# Patient Record
Sex: FEMALE | Race: WHITE | Employment: STUDENT | URBAN - METROPOLITAN AREA
[De-identification: names, ages, dates, MRNs, and addresses within clinical notes are randomized per-mention and may not be internally consistent; named-entity substitution may affect disease eponyms.]

---

## 2017-01-23 ENCOUNTER — TELEPHONE (OUTPATIENT)
Dept: PULMONOLOGY | Facility: CLINIC | Age: 27
End: 2017-01-23

## 2017-01-23 NOTE — TELEPHONE ENCOUNTER
Prior Authorization Retail Medication Request  Medication/Dose: Cayston   Diagnosis and ICD code: e84.0  New/Renewal/Insurance Change PA:   Previously Tried and Failed Therapies:     Insurance ID (if provided):   Insurance Phone (if provided):     Any additional info from fax request:     If you received a fax notification from an outside Pharmacy:  Pharmacy Name:  Pharmacy #:  Pharmacy Fax:

## 2017-01-27 DIAGNOSIS — E84.0 CYSTIC FIBROSIS WITH PULMONARY MANIFESTATIONS (H): Primary | ICD-10-CM

## 2017-01-27 RX ORDER — TOBRAMYCIN INHALATION SOLUTION 300 MG/5ML
300 INHALANT RESPIRATORY (INHALATION) 2 TIMES DAILY
Qty: 280 ML | Refills: 6 | Status: SHIPPED | OUTPATIENT
Start: 2017-01-27 | End: 2017-05-24

## 2017-01-31 NOTE — TELEPHONE ENCOUNTER
[facility] Prior Authorization Team   Phone: [phone]  Fax: [phone]      PA Initiation    Medication: Cayston   Insurance Company: [employer] - Phone [phone] Fax [phone]  Pharmacy Filling the Rx: [facility] - [address]  Filling Pharmacy Phone: [phone]  Filling Pharmacy Fax: [phone]  Start Date: 1/31/2017

## 2017-02-02 ENCOUNTER — TELEPHONE (OUTPATIENT)
Dept: PULMONOLOGY | Facility: CLINIC | Age: 27
End: 2017-02-02

## 2017-02-02 NOTE — TELEPHONE ENCOUNTER
[facility] Prior Authorization Team   Phone: [phone]  Fax: [phone]      PA Initiation    Medication: tobramycin, PF, (ASUNCION) 300 MG/5ML neb solution   Insurance Company: [employer] - Phone [phone] Fax [phone]  Pharmacy Filling the Rx: [facility] - [place] [address] [address]   Filling Pharmacy Phone: [phone]  Filling Pharmacy Fax: [phone]  Start Date: 2/2/2017    Prior Authorization Approval    Authorization Effective Date: 1/31/2017  Authorization Expiration Date: 1/31/2019  Medication: tobramycin, PF, (ASUNCION) 300 MG/5ML neb solution   Approved Dose/Quantity: UD  Reference #: [id number]   Insurance Company: [employer] - [employer] [phone] Fax [phone]  Expected CoPay: $0     CoPay Card Available: No   Foundation Assistance Needed: na  Which Pharmacy is filling the prescription (Not needed for infusion/clinic administered): [facility] - [place] [address] [address]  Pharmacy Notified: Yes  Patient Notified: Yes

## 2017-02-02 NOTE — TELEPHONE ENCOUNTER
Prior Authorization Approval    Authorization Effective Date: 2/1/2017  Authorization Expiration Date: 2/1/2019  Medication: [place]   Approved Dose/Quantity: UD  Reference #: [id number]   Insurance Company: [employer] - Phone [phone] Fax [phone]  Expected CoPay: $0     CoPay Card Available: No   Foundation Assistance Needed: NA  Which Pharmacy is filling the prescription (Not needed for infusion/clinic administered): [facility] [place] - [address]  Pharmacy Notified: Yes  Patient Notified: Yes

## 2017-02-08 ASSESSMENT — ENCOUNTER SYMPTOMS
COUGH DISTURBING SLEEP: 1
WHEEZING: 0
HEMOPTYSIS: 0
SNORES LOUDLY: 0
SHORTNESS OF BREATH: 0
POSTURAL DYSPNEA: 0
RESPIRATORY PAIN: 0
COUGH: 1
DYSPNEA ON EXERTION: 0
SPUTUM PRODUCTION: 1

## 2017-02-08 ASSESSMENT — ACTIVITIES OF DAILY LIVING (ADL)
ARE_THERE_CARBON_MONOXIDE_DETECTORS_IN_YOUR_HOME?: Y
DO_MEMBERS_OF_YOUR_HOUSEHOLD_USE_SAFETY_HELMETS?: Y
ARE_THERE_SMOKE_DETECTORS_IN_YOUR_HOME?: Y
DO_MEMBERS_OF_YOUR_HOUSEHOLD_WEAR_SEAT_BELTS?: Y
ARE_THERE_FIREARMS_IN_YOUR_HOME?: N
DO_MEMBERS_OF_YOUR_HOUSEHOLD_USE_SUNSCREEN?: Y

## 2017-02-09 ENCOUNTER — TELEPHONE (OUTPATIENT)
Dept: PULMONOLOGY | Facility: CLINIC | Age: 27
End: 2017-02-09

## 2017-02-09 ENCOUNTER — TELEPHONE (OUTPATIENT)
Dept: PHARMACY | Facility: CLINIC | Age: 27
End: 2017-02-09

## 2017-02-09 DIAGNOSIS — E84.0 CYSTIC FIBROSIS WITH PULMONARY MANIFESTATIONS (H): Primary | ICD-10-CM

## 2017-02-09 NOTE — TELEPHONE ENCOUNTER
Contacted by patient regarding the prior authorization for Sterling Eppersoner that was denied.  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  She also states that she does not have time to sit for an extra half hour beyond her vest treatment to do the nebulized tobramycin and therefore her adherence is not as good as it is with the podhaler.    Will send a request to the PA team to appeal the denial.

## 2017-02-09 NOTE — TELEPHONE ENCOUNTER
Prior Authorization Specialty Medication Request    Medication/Dose: Sterling Chávez  Diagnosis and ICD: cystic fibrosis E84  New PA: Sterling Chávez PA denied, please appeal    Previously Tried and Failed Therapies: tobramycin for nebulization    Rationale: see telephone encounter from 2/9/17

## 2017-02-16 NOTE — TELEPHONE ENCOUNTER
Medication Appeal Initiation    We have initiated an appeal for the requested medication:  Medication: Sterling Podhaler--appeal pending  Appeal Start Date:  02/16/2017   Insurance Company:  [employer]  Comments:

## 2017-02-17 DIAGNOSIS — E84.9 CF (CYSTIC FIBROSIS) (H): Primary | ICD-10-CM

## 2017-02-17 NOTE — TELEPHONE ENCOUNTER
MEDICATION APPEAL DENIED    Medication: Sterling Chávez--appeal DENIED    Denial Date:  02/16/2017    Denial Rational:     Second Level Appeal Information: Please see below for second level appeal information.    Second level appeals will be managed by the clinic staff and provider. Please contact the [employer] Prior Authorization Team if additional information about the denial is needed.

## 2017-02-20 DIAGNOSIS — E84.0 CYSTIC FIBROSIS WITH PULMONARY MANIFESTATIONS (H): Primary | ICD-10-CM

## 2017-02-22 ENCOUNTER — OFFICE VISIT (OUTPATIENT)
Dept: PULMONOLOGY | Facility: CLINIC | Age: 27
End: 2017-02-22
Attending: INTERNAL MEDICINE
Payer: COMMERCIAL

## 2017-02-22 VITALS
BODY MASS INDEX: 21.75 KG/M2 | WEIGHT: 127.43 LBS | RESPIRATION RATE: 16 BRPM | SYSTOLIC BLOOD PRESSURE: 139 MMHG | OXYGEN SATURATION: 100 % | HEART RATE: 93 BPM | HEIGHT: 64 IN | DIASTOLIC BLOOD PRESSURE: 93 MMHG

## 2017-02-22 DIAGNOSIS — E55.9 VITAMIN D DEFICIENCY: ICD-10-CM

## 2017-02-22 DIAGNOSIS — E84.9 CF (CYSTIC FIBROSIS) (H): ICD-10-CM

## 2017-02-22 DIAGNOSIS — E84.0 CYSTIC FIBROSIS WITH PULMONARY MANIFESTATIONS (H): Primary | ICD-10-CM

## 2017-02-22 DIAGNOSIS — E84.9 CYSTIC FIBROSIS (H): Primary | ICD-10-CM

## 2017-02-22 DIAGNOSIS — K86.89 PANCREATIC INSUFFICIENCY: ICD-10-CM

## 2017-02-22 LAB
EXPTIME-PRE: 9.51 SEC
FEF2575-%PRED-PRE: 115 %
FEF2575-PRE: 4.16 L/SEC
FEF2575-PRED: 3.61 L/SEC
FEFMAX-%PRED-PRE: 106 %
FEFMAX-PRE: 7.43 L/SEC
FEFMAX-PRED: 6.96 L/SEC
FEV1-%PRED-PRE: 97 %
FEV1-PRE: 3.01 L
FEV1FEV6-PRE: 90 %
FEV1FEV6-PRED: 86 %
FEV1FVC-PRE: 90 %
FEV1FVC-PRED: 88 %
FIFMAX-PRE: 5.34 L/SEC
FVC-%PRED-PRE: 95 %
FVC-PRE: 3.36 L
FVC-PRED: 3.53 L

## 2017-02-22 PROCEDURE — 97803 MED NUTRITION INDIV SUBSEQ: CPT | Mod: ZF | Performed by: DIETITIAN, REGISTERED

## 2017-02-22 PROCEDURE — 87070 CULTURE OTHR SPECIMN AEROBIC: CPT | Performed by: INTERNAL MEDICINE

## 2017-02-22 PROCEDURE — 99212 OFFICE O/P EST SF 10 MIN: CPT | Mod: ZF

## 2017-02-22 PROCEDURE — 87077 CULTURE AEROBIC IDENTIFY: CPT | Performed by: INTERNAL MEDICINE

## 2017-02-22 PROCEDURE — 94375 RESPIRATORY FLOW VOLUME LOOP: CPT | Mod: ZF | Performed by: INTERNAL MEDICINE

## 2017-02-22 ASSESSMENT — ENCOUNTER SYMPTOMS
PANIC: 0
SPUTUM PRODUCTION: 1
HEARTBURN: 0
CLAUDICATION: 0
HOT FLASHES: 0
STIFFNESS: 0
SPEECH CHANGE: 0
JOINT SWELLING: 0
WEIGHT GAIN: 0
RECTAL PAIN: 0
RESPIRATORY PAIN: 0
POLYDIPSIA: 0
MYALGIAS: 0
FEVER: 0
DECREASED CONCENTRATION: 0
SINUS CONGESTION: 0
SHORTNESS OF BREATH: 0
DIFFICULTY URINATING: 0
TACHYCARDIA: 0
MUSCLE CRAMPS: 0
MEMORY LOSS: 0
SLEEP DISTURBANCES DUE TO BREATHING: 0
DECREASED LIBIDO: 0
EYE WATERING: 0
WEAKNESS: 0
DYSPNEA ON EXERTION: 0
SINUS PAIN: 0
TROUBLE SWALLOWING: 0
NECK PAIN: 0
TASTE DISTURBANCE: 0
INCREASED ENERGY: 0
BOWEL INCONTINENCE: 0
BLOOD IN STOOL: 0
BLOATING: 0
LEG SWELLING: 0
EYE PAIN: 0
COUGH DISTURBING SLEEP: 1
RECTAL BLEEDING: 0
VOMITING: 0
NAUSEA: 0
HEMATURIA: 0
ARTHRALGIAS: 0
TINGLING: 0
EYE REDNESS: 0
SORE THROAT: 0
FLANK PAIN: 0
HEMOPTYSIS: 0
CHILLS: 0
ALTERED TEMPERATURE REGULATION: 0
BRUISES/BLEEDS EASILY: 0
DEPRESSION: 0
JAUNDICE: 0
NAIL CHANGES: 0
SNORES LOUDLY: 0
SMELL DISTURBANCE: 0
POOR WOUND HEALING: 0
SYNCOPE: 0
LOSS OF CONSCIOUSNESS: 0
DISTURBANCES IN COORDINATION: 0
INSOMNIA: 0
LEG PAIN: 0
NUMBNESS: 0
DOUBLE VISION: 0
HOARSE VOICE: 0
WHEEZING: 0
HEADACHES: 0
HALLUCINATIONS: 0
POLYPHAGIA: 0
SEIZURES: 0
BACK PAIN: 0
PARALYSIS: 0
HYPERTENSION: 0
ABDOMINAL PAIN: 0
NIGHT SWEATS: 0
EYE IRRITATION: 0
NECK MASS: 0
DYSURIA: 0
POSTURAL DYSPNEA: 0
CONSTIPATION: 0
WEIGHT LOSS: 0
MUSCLE WEAKNESS: 0
EXTREMITY NUMBNESS: 0
DECREASED APPETITE: 0
BREAST PAIN: 0
SWOLLEN GLANDS: 0
ORTHOPNEA: 0
PALPITATIONS: 0
SKIN CHANGES: 0
NERVOUS/ANXIOUS: 0
DIARRHEA: 0
COUGH: 1
TREMORS: 0
EXERCISE INTOLERANCE: 0
FATIGUE: 0
LIGHT-HEADEDNESS: 0
BREAST MASS: 0
HYPOTENSION: 0
DIZZINESS: 0

## 2017-02-22 ASSESSMENT — PAIN SCALES - GENERAL: PAINLEVEL: NO PAIN (0)

## 2017-02-22 NOTE — NURSING NOTE
Chief Complaint   Patient presents with     Cystic Fibrosis     Patient is being seen for CF follow up      Herminia Schulz CMA at 8:59 AM on 2/22/2017

## 2017-02-22 NOTE — PROGRESS NOTES
"CF Annual Nutrition Assessment    Reason for Assessment  Assessed during Dr. 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  Current weight is considered: Normal BMI; however, not a CF goal BMI     Physical Activity/Exercise:  No formal exercise regimen per pt but she has been walking a lot more and being active at her job.  She says this is likely the reason for her gradual trend down in weight.     Wt Readings from Last 8 Encounters:   02/22/17 57.8 kg (127 lb 6.8 oz)   11/22/16 59.6 kg (131 lb 6.3 oz)   09/13/16 59.9 kg (132 lb 0.9 oz)   09/13/16 60.4 kg (133 lb 3.2 oz)   06/01/16 62.2 kg (137 lb 2 oz)   03/01/16 60.6 kg (133 lb 9.6 oz)   01/13/16 59.4 kg (131 lb)   12/09/15 59.9 kg (132 lb)       Pancreatic Enzymes  Brand:  Zenpep 20  Dosing: 3-4 with meals, 1-2 with snacks  Are you taking enzymes as recommended:Yes     High dose providing 1384 units lipase/kg/meal which is within the recommended range per CF Foundation to inhibit fibrosing colonopathy.    Signs of Malabsorption:  No  Enzyme Program:  Yes - [id number]    Diet History and Assessment  Diet Preferences/Allergies.Intolerances: Regular diet  Appetite Stimulant Rx:  No  Intake Recall/Comments:  No changes to diet in recent months.  Consuming 3 meals per day and 1 snack on average.  Does not use nutrition supplement products at this time.  Continues to try to limit simple sugars due to hx high TGL levels and indeterminate/slightly " worsened OGTT in previous years.     Calcium: 1-2 containers yogurt everyday (6 ounces, Chobani greek yogurt which provides 150 mg Ca++), cheese in some meals.  Salt: Adequate/added to food  Hydration:  Good intake of water and other non-caffeinated beverages throughout the day    Supplements:  No, however pt did not know these were available to her for free through [id number], may start ordering them after today's visit (shakes or bars)    Tube Feeding:  No    MALNUTRITION  % Intake:  No decreased intake noted  % Weight Loss:  7% in 8 months, unintentional but does not meet level of significance for malnutrition  Subcutaneous Fat Loss:  Mild  Muscle Loss:  None noted  Fluid Accumulation/Edema:  None noted    Malnutrition Diagnosis: Patient does not meet two of the above criteria necessary for diagnosing malnutrition.    Estimated Energy and Protein Needs  Estimation based on weight maintenance with Mild lung disease and mildly pancreatic insufficient.    3377-5270 kcals/day = 30-35 kcal/kg  61-73 g protein/day = 1-1.2 g/kg    Laboratory Assessment  Date: 9/13/16 (last annual studies)  Total 25-Hydroxyvitamin D: 29 - slightly low  Vitamin A: WNL  Vitamin E: WNL  Iron: WNL  OGTT: Normal, nearly indeterminate, has been indeterminate in previous years     -HbA1c 5.7% which is WNL but has been increasing x3 years  Lipid Panel: Low HDL, high TGL but improved from previous years    Current Vitamin/Mineral Prescription R/T deficiency/malabsorption: MVW Complete  x2 per day.    Comments:  Loves the change from AquADEK to MVW, great GI tolerance.  Is thinking about adding in a daily probiotic, just ordered Pearls brand (unsure of which specific product).       NUTRITION DIAGNOSIS  Impaired nutrient utilization r/t pancreatic insufficiency and CF hypermetabolism AEB requires PERT and higher kcal/pro diet to maintain nutrition and health status.    INTERVENTIONS/RECOMMENDATIONS  To counteract weight loss encouraged pt to  maintain/increase caloric intake through diet as well as potentially start a nutrition supplement as she can receive these at no cost through [id number].  Reviewed options, recommended a shake or bar as per what would fit best into her daily lifestyle.  Using a nutrition supplement such as Boost will also help improve her calcium intake - Boost supplements typically contain at least 300 mg of Calcium per 8 oz container. In addition to her daily yogurt and cheese intake this will likely get her calcium intake toward a level of adequacy.   Also encouraged pt to use [employer] to obtain MVW  product once this is available.  This program is in transition from [employer] to the MVW products and so this special version is not yet available but should be within the year.   Discussed pt's overall goals r/t weight and health, she is in agreement with the nutrition plan of care.     Patient Understanding: Excellent  Expected Compliance: Excellent  Follow-Up Plans: Per protocol - pt stated she will receive one more session of annual studies with us at this center prior to moving to [place] in the fall, likely in August.     GOALS:  1) Weight increase over the next 3 months to goal BMI of 22 kg/m2 or above.   2) 100% compliance with prescribed vitamin/mineral and enzyme regimen.     FOLLOW-UP/MONITORING:  Visit patient within 12 month(s)     -Retest vitamin D with next annual studies, also re-evaluate calcium intake adequacy.  Annual studies August/September 2017 with OGTT and DEXA.     Time Spent In Face-to-Face Patient Interactions: 15 minutes      Marce Guevara MS, RD, LD (pager [phone])  Cystic Fibrosis/Lung Transplant Dietitian      -Available Tues-Fri 8 AM-4:30 PM. On Mondays please contact pager [phone] (Norma Mcmullen RD) and on weekends/holidays contact coverage dietitian at pager [phone] (inpatient use only).

## 2017-02-22 NOTE — LETTER
2/22/2017       RE: Meredith Swartz  [address]  [place] [address]     Dear Colleague,    Thank you for referring your patient, Meredith Swartz, to the [facility] at [facility]. Please see a copy of my visit note below.    [facility]  February 22, 2017         Assessment and Plan:   Meredith Swartz is a 26 year old female with cystic fibrosis.    1. CF lung disease with normal spirometry:  Mereidth once again shows evidence of excellent stability in her pulmonary function as well as her pulmonary symptomatology.  In her last sputum Meredith grew out normal karen.  At this time, I would recommend that she continue on her present bronchial drainage and nebulized therapies.   2.  Hypertension.  This has been evaluated in the past by Nephrology.  It is felt after ambulatory blood pressures that there was no need for treatment.  I would recommend that this be followed closely.   3.  Health care maintenance.  Meredith is up-to-date on her annual studies.   4.  Psychosocial.  Meredith's significant other has been accepted into a graduate position in Neurosciences at [employer].  They do plan to [place] in August.  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  She enjoys her work very much.   5. Pancreatic Insufficiency:  The patient has no new symptoms consistent with worsening malabsorption.  The plan is to continue the present dose of pancreatic enzymes and vitamin supplementation.    Janeth Craig MD MPH   of Medicine  Pulmonary, Allergy, Critical Care and Sleep Medicine      Interval History:     Meredith continues to produce sputum that is yellow in color.  Her cough frequency and sputum volume are improved to baseline.  She denies any difficulty with activity tolerance.  She does perform 2 vest therapies per day.          Review of Systems:     All  questionnaires were reviewed by me today with the patient.  Review of Systems     Constitutional:  Negative for fever, chills, weight loss, weight gain, fatigue, decreased appetite, night sweats, recent stressors, height gain, height loss, post-operative complications, incisional pain, hallucinations, increased energy, hyperactivity and confused.   HENT:  Negative for ear pain, hearing loss, tinnitus, nosebleeds, trouble swallowing, hoarse voice, mouth sores, sore throat, ear discharge, tooth pain, gum tenderness, taste disturbance, smell disturbance, hearing aid, bleeding gums, dry mouth, sinus pain, sinus congestion and neck mass.    Eyes:  Negative for double vision, pain, redness, eye pain, decreased vision, eye watering, eye bulging, eye dryness, flashing lights, spots, floaters, strabismus, tunnel vision, jaundice and eye irritation.   Respiratory:   Positive for cough, sputum production and cough disturbing sleep. Negative for hemoptysis, shortness of breath, wheezing, sleep disturbances due to breathing, snores loudly, respiratory pain, dyspnea on exertion and postural dyspnea.    Cardiovascular:  Negative for chest pain, dyspnea on exertion, palpitations, orthopnea, claudication, leg swelling, fingers/toes turn blue, hypertension, hypotension, syncope, history of heart murmur, chest pain on exertion, chest pain at rest, pacemaker, few scattered varicosities, leg pain, sleep disturbances due to breathing, tachycardia, light-headedness, exercise intolerance and edema.   Gastrointestinal:  Negative for heartburn, nausea, vomiting, abdominal pain, diarrhea, constipation, blood in stool, melena, rectal pain, bloating, hemorrhoids, bowel incontinence, jaundice, rectal bleeding, coffee ground emesis and change in stool.   Genitourinary:  Negative for bladder incontinence, dysuria, urgency, hematuria, flank pain, vaginal discharge, difficulty urinating, genital sores, dyspareunia, decreased libido, nocturia,  voiding less frequently, arousal difficulty, abnormal vaginal bleeding, excessive menstruation, menstrual changes, hot flashes, vaginal dryness and postmenopausal bleeding.   Musculoskeletal:  Negative for myalgias, back pain, joint swelling, arthralgias, stiffness, muscle cramps, neck pain, bone pain, muscle weakness and fracture.   Skin:  Negative for nail changes, itching, poor wound healing, rash, hair changes, skin changes, acne, warts, poor wound healing, scarring, flaky skin, Raynaud's phenomenon, sensitivity to sunlight and skin thickening.   Neurological:  Negative for dizziness, tingling, tremors, speech change, seizures, loss of consciousness, weakness, light-headedness, numbness, headaches, disturbances in coordination, extremity numbness, memory loss, difficulty walking and paralysis.   Endo/Heme:  Negative for anemia, swollen glands and bruises/bleeds easily.   Psychiatric/Behavioral:  Negative for depression, hallucinations, memory loss, decreased concentration, mood swings and panic attacks.    Breast:  Negative for breast discharge, breast mass, breast pain and nipple retraction.   Endocrine:  Negative for altered temperature regulation, polyphagia, polydipsia, unwanted hair growth and change in facial hair.            Past Medical and Surgical History:     Past Medical History   Diagnosis Date     Bronchiectasis (H)      CF (cystic fibrosis) (H)      MRSA (methicillin resistant staph aureus) culture positive      Pancreatic insufficiency      Rectal prolapse      No past surgical history on file.        Family History:     Family History   Problem Relation Age of Onset     Asthma Father      Family History Negative Mother      Asthma Sister      exercise induced      C.A.D. Paternal Grandfather      CANCER Paternal Grandmother      DIABETES Maternal Grandfather      C.A.D. Maternal Grandfather      HEART DISEASE Maternal Grandmother             Social History:     Social History     Social History  "    Marital status: Single     Spouse name: N/A     Number of children: N/A     Years of education: N/A     Occupational History     student [employer]     Social History Main Topics     Smoking status: Never Smoker     Smokeless tobacco: Never Used     Alcohol use No     Drug use: No     Sexual activity: Not on file     Other Topics Concern     Not on file     Social History Narrative            Medications:     Current Outpatient Prescriptions   Medication     tobramycin (ASUNCION PODHALER) 28 MG in caps     tobramycin, PF, (ASUNCION) 300 MG/5ML neb solution     azithromycin (ZITHROMAX) 500 MG tablet     sodium chloride inhalant (HYPER-SAL) 7 % NEBU neb solution     aztreonam lysine (CAYSTON) 75 MG SOLR     Multiple Vitamins-Minerals (COMPLETE FORMULATION ) CAPS     cholecalciferol (VITAMIN D) 1000 UNIT tablet     Albuterol Sulfate 108 (90 BASE) MCG/ACT AEPB     [DISCONTINUED] albuterol (2.5 MG/3ML) 0.083% nebulizer solution     ZENPEP 15889 UNITS CPEP     fluticasone (FLONASE) 50 MCG/ACT nasal spray     sodium chloride, Inhalant, (BRONCHO SALINE) 0.9 % AERS     albuterol (2.5 MG/3ML) 0.083% neb solution     dornase alpha (PULMOZYME) 1 MG/ML neb solution     No current facility-administered medications for this visit.             Physical Exam:   BP (!) 139/93 (BP Location: Right arm, Patient Position: Chair, Cuff Size: Adult Regular)  Pulse 93  Resp 16  Ht 1.63 m (5' 4.17\")  Wt 57.8 kg (127 lb 6.8 oz)  SpO2 100%  BMI 21.75 kg/m2    Constitutional:   Awake, alert and in no apparent distress     Eyes:   nonicteric     ENT:   oral mucosa moist without lesions, normal tm bilaterally, bilateral mucosal normal     Neck:   Supple without supraclavicular or cervical lymphadenopathy     Lungs:   Good air flow.  No crackles. No rhonchi.  No wheezes.     Cardiovascular:   Normal S1 and S2.  RRR.  No murmur, gallop or rub.     Abdomen:   NABS, soft, nontender, nondistended.       Musculoskeletal:   No edema, digital " clubbing present     Neurologic:   Alert and conversant.     Skin:   Warm, dry.  No rash on limited exam.             Data:   All laboratory and imaging data reviewed.    Cystic Fibrosis Culture  Specimen Description   Date Value Ref Range Status   02/22/2017 Throat  Final   11/22/2016 Sputum  Final   11/22/2016 Sputum  Final   11/22/2016 Sputum  Final    Culture Micro   Date Value Ref Range Status   02/22/2017   Final    Moderate growth Normal karen to date  Culture in progress     11/22/2016 (A)  Final    Heavy growth Normal karen  Single colony Aspergillus fumigatus     11/22/2016   Final    Culture negative for acid fast bacilli  Assayed at [facility] [address]          Recent Results (from the past 168 hour(s))   General PFT Lab (Please always keep checked)    Collection Time: 02/22/17  8:36 AM   Result Value Ref Range    FVC-Pred 3.53 L    FVC-Pre 3.36 L    FVC-%Pred-Pre 95 %    FEV1-Pre 3.01 L    FEV1-%Pred-Pre 97 %    FEV1FVC-Pred 88 %    FEV1FVC-Pre 90 %    FEFMax-Pred 6.96 L/sec    FEFMax-Pre 7.43 L/sec    FEFMax-%Pred-Pre 106 %    FEF2575-Pred 3.61 L/sec    FEF2575-Pre 4.16 L/sec    DMM6257-%Pred-Pre 115 %    ExpTime-Pre 9.51 sec    FIFMax-Pre 5.34 L/sec    FEV1FEV6-Pred 86 %    FEV1FEV6-Pre 90 %   Cystic Fibrosis Culture Aerob Bacterial    Collection Time: 02/22/17 10:40 AM   Result Value Ref Range    Specimen Description Throat     Special Requests Specimen collected in eSwab transport (white cap)     Culture Micro       Moderate growth Normal karen to date  Culture in progress      Micro Report Status Pending        PFT: The spirometry is normal.  When compared to 11/22/2016, the FEV1 and FVC have increased.      CF Annual Nutrition Assessment    Reason for Assessment  Assessed during Dr. 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  Current weight is considered: Normal BMI; however, not a CF goal BMI     Physical Activity/Exercise:  No formal exercise regimen per pt but she has been walking a lot more and being active at her job.  She says this is likely the reason for her gradual trend down in weight.     Wt Readings from Last 8 Encounters:   02/22/17 57.8 kg (127 lb 6.8 oz)   11/22/16 59.6 kg (131 lb 6.3 oz)   09/13/16 59.9 kg (132 lb 0.9 oz)   09/13/16 60.4 kg (133 lb 3.2 oz)   06/01/16 62.2 kg (137 lb 2 oz)   03/01/16 60.6 kg (133 lb 9.6 oz)   01/13/16 59.4 kg (131 lb)   12/09/15 59.9 kg (132 lb)       Pancreatic Enzymes  Brand:  Zenpep 20  Dosing: 3-4 with meals, 1-2 with snacks  Are you taking enzymes as recommended:Yes     High dose providing 1384 units lipase/kg/meal which is within the recommended range per CF Foundation to inhibit fibrosing colonopathy.    Signs of Malabsorption:  No  Enzyme Program:  Yes - [id number]    Diet History and Assessment  Diet Preferences/Allergies.Intolerances: Regular diet  Appetite Stimulant Rx:  No  Intake Recall/Comments:  No changes to diet in recent months.  Consuming 3 meals per day and 1 snack on average.  Does not use nutrition supplement products at this time.  Continues to try to limit simple sugars due to hx high TGL levels and indeterminate/slightly worsened OGTT in previous years.     Calcium: 1-2 containers yogurt everyday (6 ounces, Chobani greek yogurt which provides 150 mg Ca++), cheese in some meals.  Salt: Adequate/added to food  Hydration:  Good intake of water and other non-caffeinated beverages " throughout the day    Supplements:  No, however pt did not know these were available to her for free through [id number], may start ordering them after today's visit (shakes or bars)    Tube Feeding:  No    MALNUTRITION  % Intake:  No decreased intake noted  % Weight Loss:  7% in 8 months, unintentional but does not meet level of significance for malnutrition  Subcutaneous Fat Loss:  Mild  Muscle Loss:  None noted  Fluid Accumulation/Edema:  None noted    Malnutrition Diagnosis: Patient does not meet two of the above criteria necessary for diagnosing malnutrition.    Estimated Energy and Protein Needs  Estimation based on weight maintenance with Mild lung disease and mildly pancreatic insufficient.    8162-0680 kcals/day = 30-35 kcal/kg  61-73 g protein/day = 1-1.2 g/kg    Laboratory Assessment  Date: 9/13/16 (last annual studies)  Total 25-Hydroxyvitamin D: 29 - slightly low  Vitamin A: WNL  Vitamin E: WNL  Iron: WNL  OGTT: Normal, nearly indeterminate, has been indeterminate in previous years     -HbA1c 5.7% which is WNL but has been increasing x3 years  Lipid Panel: Low HDL, high TGL but improved from previous years    Current Vitamin/Mineral Prescription R/T deficiency/malabsorption: MVW Complete  x2 per day.    Comments:  Loves the change from AquADEK to MVW, great GI tolerance.  Is thinking about adding in a daily probiotic, just ordered Pearls brand (unsure of which specific product).       NUTRITION DIAGNOSIS  Impaired nutrient utilization r/t pancreatic insufficiency and CF hypermetabolism AEB requires PERT and higher kcal/pro diet to maintain nutrition and health status.    INTERVENTIONS/RECOMMENDATIONS  To counteract weight loss encouraged pt to maintain/increase caloric intake through diet as well as potentially start a nutrition supplement as she can receive these at no cost through [id number].  Reviewed options, recommended a shake or bar as per what would fit best into her daily lifestyle.   Using a nutrition supplement such as Boost will also help improve her calcium intake - Boost supplements typically contain at least 300 mg of Calcium per 8 oz container. In addition to her daily yogurt and cheese intake this will likely get her calcium intake toward a level of adequacy.   Also encouraged pt to use [employer] to obtain MVW  product once this is available.  This program is in transition from AquADEK to the MVW products and so this special version is not yet available but should be within the year.   Discussed pt's overall goals r/t weight and health, she is in agreement with the nutrition plan of care.     Patient Understanding: Excellent  Expected Compliance: Excellent  Follow-Up Plans: Per protocol - pt stated she will receive one more session of annual studies with us at this center prior to moving to [place] in the fall, likely in August.     GOALS:  1) Weight increase over the next 3 months to goal BMI of 22 kg/m2 or above.   2) 100% compliance with prescribed vitamin/mineral and enzyme regimen.     FOLLOW-UP/MONITORING:  Visit patient within 12 month(s)     -Retest vitamin D with next annual studies, also re-evaluate calcium intake adequacy.  Annual studies August/September 2017 with OGTT and DEXA.     Time Spent In Face-to-Face Patient Interactions: 15 minutes      Marce Guevara MS, RD, LD (pager [phone])  Cystic Fibrosis/Lung Transplant Dietitian      -Available Tues-Fri 8 AM-4:30 PM. On Mondays please contact pager [phone] (Norma Mcmullen RD) and on weekends/holidays contact coverage dietitian at pager [phone] (inpatient use only).       Again, thank you for allowing me to participate in the care of your patient.      Sincerely,    Janeth Craig MD

## 2017-02-22 NOTE — PROGRESS NOTES
[facility]  February 22, 2017         Assessment and Plan:   Meredith Swartz is a 26 year old female with cystic fibrosis.    1. CF lung disease with normal spirometry:  Meredith once again shows evidence of excellent stability in her pulmonary function as well as her pulmonary symptomatology.  In her last sputum Meredith grew out normal karen.  At this time, I would recommend that she continue on her present bronchial drainage and nebulized therapies.   2.  Hypertension.  This has been evaluated in the past by Nephrology.  It is felt after ambulatory blood pressures that there was no need for treatment.  I would recommend that this be followed closely.   3.  Health care maintenance.  Meredith is up-to-date on her annual studies.   4.  Psychosocial.  Meredith's significant other has been accepted into a graduate position in Neurosciences at [employer].  They do plan to [place] in August.  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  She enjoys her work very much.   5. Pancreatic Insufficiency:  The patient has no new symptoms consistent with worsening malabsorption.  The plan is to continue the present dose of pancreatic enzymes and vitamin supplementation.    Janeth Craig MD MPH   of Medicine  Pulmonary, Allergy, Critical Care and Sleep Medicine      Interval History:     Meredith continues to produce sputum that is yellow in color.  Her cough frequency and sputum volume are improved to baseline.  She denies any difficulty with activity tolerance.  She does perform 2 vest therapies per day.          Review of Systems:     All questionnaires were reviewed by me today with the patient.  Review of Systems     Constitutional:  Negative for fever, chills, weight loss, weight gain, fatigue, decreased appetite, night sweats, recent stressors, height gain, height loss, post-operative complications, incisional pain, hallucinations, increased  energy, hyperactivity and confused.   HENT:  Negative for ear pain, hearing loss, tinnitus, nosebleeds, trouble swallowing, hoarse voice, mouth sores, sore throat, ear discharge, tooth pain, gum tenderness, taste disturbance, smell disturbance, hearing aid, bleeding gums, dry mouth, sinus pain, sinus congestion and neck mass.    Eyes:  Negative for double vision, pain, redness, eye pain, decreased vision, eye watering, eye bulging, eye dryness, flashing lights, spots, floaters, strabismus, tunnel vision, jaundice and eye irritation.   Respiratory:   Positive for cough, sputum production and cough disturbing sleep. Negative for hemoptysis, shortness of breath, wheezing, sleep disturbances due to breathing, snores loudly, respiratory pain, dyspnea on exertion and postural dyspnea.    Cardiovascular:  Negative for chest pain, dyspnea on exertion, palpitations, orthopnea, claudication, leg swelling, fingers/toes turn blue, hypertension, hypotension, syncope, history of heart murmur, chest pain on exertion, chest pain at rest, pacemaker, few scattered varicosities, leg pain, sleep disturbances due to breathing, tachycardia, light-headedness, exercise intolerance and edema.   Gastrointestinal:  Negative for heartburn, nausea, vomiting, abdominal pain, diarrhea, constipation, blood in stool, melena, rectal pain, bloating, hemorrhoids, bowel incontinence, jaundice, rectal bleeding, coffee ground emesis and change in stool.   Genitourinary:  Negative for bladder incontinence, dysuria, urgency, hematuria, flank pain, vaginal discharge, difficulty urinating, genital sores, dyspareunia, decreased libido, nocturia, voiding less frequently, arousal difficulty, abnormal vaginal bleeding, excessive menstruation, menstrual changes, hot flashes, vaginal dryness and postmenopausal bleeding.   Musculoskeletal:  Negative for myalgias, back pain, joint swelling, arthralgias, stiffness, muscle cramps, neck pain, bone pain, muscle weakness  and fracture.   Skin:  Negative for nail changes, itching, poor wound healing, rash, hair changes, skin changes, acne, warts, poor wound healing, scarring, flaky skin, Raynaud's phenomenon, sensitivity to sunlight and skin thickening.   Neurological:  Negative for dizziness, tingling, tremors, speech change, seizures, loss of consciousness, weakness, light-headedness, numbness, headaches, disturbances in coordination, extremity numbness, memory loss, difficulty walking and paralysis.   Endo/Heme:  Negative for anemia, swollen glands and bruises/bleeds easily.   Psychiatric/Behavioral:  Negative for depression, hallucinations, memory loss, decreased concentration, mood swings and panic attacks.    Breast:  Negative for breast discharge, breast mass, breast pain and nipple retraction.   Endocrine:  Negative for altered temperature regulation, polyphagia, polydipsia, unwanted hair growth and change in facial hair.            Past Medical and Surgical History:     Past Medical History   Diagnosis Date     Bronchiectasis (H)      CF (cystic fibrosis) (H)      MRSA (methicillin resistant staph aureus) culture positive      Pancreatic insufficiency      Rectal prolapse      No past surgical history on file.        Family History:     Family History   Problem Relation Age of Onset     Asthma Father      Family History Negative Mother      Asthma Sister      exercise induced      C.A.D. Paternal Grandfather      CANCER Paternal Grandmother      DIABETES Maternal Grandfather      C.A.D. Maternal Grandfather      HEART DISEASE Maternal Grandmother             Social History:     Social History     Social History     Marital status: Single     Spouse name: N/A     Number of children: N/A     Years of education: N/A     Occupational History     student [employer]     Social History Main Topics     Smoking status: Never Smoker     Smokeless tobacco: Never Used     Alcohol use No     Drug use: No     Sexual activity: Not on  "file     Other Topics Concern     Not on file     Social History Narrative            Medications:     Current Outpatient Prescriptions   Medication     tobramycin (ASUNCION PODHALER) 28 MG in caps     tobramycin, PF, (ASUNCION) 300 MG/5ML neb solution     azithromycin (ZITHROMAX) 500 MG tablet     sodium chloride inhalant (HYPER-SAL) 7 % NEBU neb solution     aztreonam lysine (CAYSTON) 75 MG SOLR     Multiple Vitamins-Minerals (COMPLETE FORMULATION ) CAPS     cholecalciferol (VITAMIN D) 1000 UNIT tablet     Albuterol Sulfate 108 (90 BASE) MCG/ACT AEPB     [DISCONTINUED] albuterol (2.5 MG/3ML) 0.083% nebulizer solution     ZENPEP 99965 UNITS CPEP     fluticasone (FLONASE) 50 MCG/ACT nasal spray     sodium chloride, Inhalant, (BRONCHO SALINE) 0.9 % AERS     albuterol (2.5 MG/3ML) 0.083% neb solution     dornase alpha (PULMOZYME) 1 MG/ML neb solution     No current facility-administered medications for this visit.             Physical Exam:   BP (!) 139/93 (BP Location: Right arm, Patient Position: Chair, Cuff Size: Adult Regular)  Pulse 93  Resp 16  Ht 1.63 m (5' 4.17\")  Wt 57.8 kg (127 lb 6.8 oz)  SpO2 100%  BMI 21.75 kg/m2    Constitutional:   Awake, alert and in no apparent distress     Eyes:   nonicteric     ENT:   oral mucosa moist without lesions, normal tm bilaterally, bilateral mucosal normal     Neck:   Supple without supraclavicular or cervical lymphadenopathy     Lungs:   Good air flow.  No crackles. No rhonchi.  No wheezes.     Cardiovascular:   Normal S1 and S2.  RRR.  No murmur, gallop or rub.     Abdomen:   NABS, soft, nontender, nondistended.       Musculoskeletal:   No edema, digital clubbing present     Neurologic:   Alert and conversant.     Skin:   Warm, dry.  No rash on limited exam.             Data:   All laboratory and imaging data reviewed.    Cystic Fibrosis Culture  Specimen Description   Date Value Ref Range Status   02/22/2017 Throat  Final   11/22/2016 Sputum  Final   11/22/2016 Sputum  " Final   11/22/2016 Sputum  Final    Culture Micro   Date Value Ref Range Status   02/22/2017   Final    Moderate growth Normal karen to date  Culture in progress     11/22/2016 (A)  Final    Heavy growth Normal karen  Single colony Aspergillus fumigatus     11/22/2016   Final    Culture negative for acid fast bacilli  Assayed at [facility],[employer] [address]          Recent Results (from the past 168 hour(s))   General PFT Lab (Please always keep checked)    Collection Time: 02/22/17  8:36 AM   Result Value Ref Range    FVC-Pred 3.53 L    FVC-Pre 3.36 L    FVC-%Pred-Pre 95 %    FEV1-Pre 3.01 L    FEV1-%Pred-Pre 97 %    FEV1FVC-Pred 88 %    FEV1FVC-Pre 90 %    FEFMax-Pred 6.96 L/sec    FEFMax-Pre 7.43 L/sec    FEFMax-%Pred-Pre 106 %    FEF2575-Pred 3.61 L/sec    FEF2575-Pre 4.16 L/sec    MOC6435-%Pred-Pre 115 %    ExpTime-Pre 9.51 sec    FIFMax-Pre 5.34 L/sec    FEV1FEV6-Pred 86 %    FEV1FEV6-Pre 90 %   Cystic Fibrosis Culture Aerob Bacterial    Collection Time: 02/22/17 10:40 AM   Result Value Ref Range    Specimen Description Throat     Special Requests Specimen collected in eSwab transport (white cap)     Culture Micro       Moderate growth Normal karen to date  Culture in progress      Micro Report Status Pending        PFT: The spirometry is normal.  When compared to 11/22/2016, the FEV1 and FVC have increased.

## 2017-02-22 NOTE — PATIENT INSTRUCTIONS
Cystic Fibrosis Self-Care Plan       MRN: [id number]   Clinic Date: February 22, 2017   Patient: Meredith Swartz     Annual Studies:   IGG   Date Value Ref Range Status   09/13/2016 1580 695 - 1620 mg/dL Final     Insulin   Date Value Ref Range Status   09/13/2016 57 mU/L Final     Comment:     Reference Range:  0-20     There are no preventive care reminders to display for this patient.      Pulmonary Function Tests  FEV1: amount of air you can blow out in 1 second  FVC: total amount of air you can take in and blow out    Your Goals:         PFT Latest Ref Rng & Units 2/22/2017   FVC L 3.36   FEV1 L 3.01   FVC% % 95   FEV1% % 97          Airway Clearance: The Most Important Way to Keep Your Lungs Healthy  Vest Settings:    Hill-Rom Frequencies: 8, 9, 10 Pressure 10 Then, Frequencies 18, 19, 20 Pressure 6      RespirTech: Quick Start with Pressure of     Do each frequency for 5 minutes; Deflate vest after each frequency & cough 3 times before beginning the next setting.    Vest and Neb Therapy should be done 2 times/day.    Good Nutrition Can Improve Lung Function and Overall Health     Take ALL of your vitamins with food     Take 1/2 of your enzymes before EVERY meal/snack and the other 1/2 mid-meal/snack    Wt Readings from Last 3 Encounters:   02/22/17 57.8 kg (127 lb 6.8 oz)   11/22/16 59.6 kg (131 lb 6.3 oz)   09/13/16 59.9 kg (132 lb 0.9 oz)       Body mass index is 21.75 kg/(m^2).         National CF Foundation Recommendations for BMI in CF Adults: Women: at least 22 Men: at least 23        Controlling Blood Sugars Helps Prevent Lung Infections & Improves Nutrition  Test blood sugar:     In the morning before eating (goal is )     2 hours after a meal (goal is less than 150)     When pre-meal glucose is greater than 150 add correction     At bedtime (if less than 100 eat a snack with 15 grams of carbohydrates  Last A1C Results:   Hemoglobin A1C   Date Value Ref Range Status   09/13/2016 5.7 4.3 - 6.0  % Final         If diabetic, measure A1C every 6 months. Goal: Under 7%    Staying Healthy    Research:  If you are interested in learning about research opportunities or have questions, please contact Rosana Salcedo at [phone] or [email or web address].      CF Foundation:  Compass is a personalized resource service to help you with the insurance, financial, legal and other issues you are facing.  It's free, confidential and available to anyone with CF.  Ask your  for more information or contact Compass directly at 942-COMPASS ([phone]) or compass@cff.org, or learn more at [email or web address].[email or web address].          RECOMMENDATIONS: Physical therapy screening eval today.  Congratulations on your move and your car.  Great job!    YOUR GOAL:  Enjoy thinking about [place].      CF Nurse Line:  Dwayne Long: [phone]   Maci Riley, RT: [phone]     Norma Mcmullen: [phone] or Marce Guevara, Dieticians: [phone]   Denise Meza, Diabetes Nurse: [phone]      Jana Lara: [phone] or Ophelia Mccann [phone], Social Workers   [email or web address]

## 2017-02-22 NOTE — MR AVS SNAPSHOT
After Visit Summary   2/22/2017    Meredith Swartz    MRN: [id number]           Patient Information     Date Of Birth          1990        Visit Information        Provider Department      2/22/2017 9:10 AM Janeth Craig MD [facility]        Today's Diagnoses     Cystic fibrosis with pulmonary manifestations (H)    -  1      Care Instructions    Cystic Fibrosis Self-Care Plan       MRN: [id number]   Clinic Date: February 22, 2017   Patient: Meredith Swartz     Annual Studies:   IGG   Date Value Ref Range Status   09/13/2016 1580 695 - 1620 mg/dL Final     Insulin   Date Value Ref Range Status   09/13/2016 57 mU/L Final     Comment:     Reference Range:  0-20     There are no preventive care reminders to display for this patient.      Pulmonary Function Tests  FEV1: amount of air you can blow out in 1 second  FVC: total amount of air you can take in and blow out    Your Goals:         PFT Latest Ref Rng & Units 2/22/2017   FVC L 3.36   FEV1 L 3.01   FVC% % 95   FEV1% % 97          Airway Clearance: The Most Important Way to Keep Your Lungs Healthy  Vest Settings:    Hill-Rom Frequencies: 8, 9, 10 Pressure 10 Then, Frequencies 18, 19, 20 Pressure 6      RespirTech: Quick Start with Pressure of     Do each frequency for 5 minutes; Deflate vest after each frequency & cough 3 times before beginning the next setting.    Vest and Neb Therapy should be done 2 times/day.    Good Nutrition Can Improve Lung Function and Overall Health     Take ALL of your vitamins with food     Take 1/2 of your enzymes before EVERY meal/snack and the other 1/2 mid-meal/snack    Wt Readings from Last 3 Encounters:   02/22/17 57.8 kg (127 lb 6.8 oz)   11/22/16 59.6 kg (131 lb 6.3 oz)   09/13/16 59.9 kg (132 lb 0.9 oz)       Body mass index is 21.75 kg/(m^2).         National CF Foundation Recommendations for BMI in CF Adults: Women: at least 22 Men: at least 23        Controlling  Blood Sugars Helps Prevent Lung Infections & Improves Nutrition  Test blood sugar:     In the morning before eating (goal is )     2 hours after a meal (goal is less than 150)     When pre-meal glucose is greater than 150 add correction     At bedtime (if less than 100 eat a snack with 15 grams of carbohydrates  Last A1C Results:   Hemoglobin A1C   Date Value Ref Range Status   09/13/2016 5.7 4.3 - 6.0 % Final         If diabetic, measure A1C every 6 months. Goal: Under 7%    Staying Healthy    Research:  If you are interested in learning about research opportunities or have questions, please contact Rosana Salcedo at [phone] or [email or web address].      CF Foundation:  Compass is a personalized resource service to help you with the insurance, financial, legal and other issues you are facing.  It's free, confidential and available to anyone with CF.  Ask your  for more information or contact Compass directly at 121-COMPASS (021-8627) or compass@cff.org, or learn more at cff.org/compass.          RECOMMENDATIONS: Physical therapy screening eval today.  Congratulations on your move and your car.  Great job!    YOUR GOAL:  Enjoy thinking about [place].      CF Nurse Line:  Dwayne Long: [phone]   Maci Riley, RT: [phone]     Norma Mcmullen: [phone] or Huber Soto: [phone]   Denise Meza, Diabetes Nurse: [phone]      Jana Lara: [phone] or Ophelia Mccann [phone], Social Workers   [email or web address]                 Follow-ups after your visit        Follow-up notes from your care team     Return in about 3 months (around 5/22/2017).      Your next 10 appointments already scheduled     May 31, 2017  9:00 AM CDT   CF LOOP with [facility] Pulmonary Function Testing ([facility])    [address] [place] [address] [id number] [phone]            May 31, 2017  9:10 AM CDT  "  (Arrive by 8:55 AM)   RETURN CYSTIC FIBROSIS VISIT with Janeth Craig MD   [facility] ([facility])    [address] [facility] [address] [phone]              Future tests that were ordered for you today     Open Future Orders        Priority Expected Expires Ordered    Cystic Fibrosis Culture Aerob Bacterial Routine  2/22/2018 2/22/2017            Who to contact     If you have questions or need follow up information about today's clinic visit or your schedule please contact [facility] directly at [phone].  Normal or non-critical lab and imaging results will be communicated to you by MyChart, letter or phone within 4 business days after the clinic has received the results. If you do not hear from us within 7 days, please contact the clinic through [employer] or phone. If you have a critical or abnormal lab result, we will notify you by phone as soon as possible.  Submit refill requests through [employer] or call your pharmacy and they will forward the refill request to us. Please allow 3 business days for your refill to be completed.          Additional Information About Your Visit        MyChart Information     [employer] gives you secure access to your electronic health record. If you see a primary care provider, you can also send messages to your care team and make appointments. If you have questions, please call your primary care clinic.  If you do not have a primary care provider, please call [phone] and they will assist you.        Care EveryWhere ID     This is your Care EveryWhere ID. This could be used by other organizations to access your [facility] medical records  [id number]        Your Vitals Were     Pulse Respirations Height Pulse Oximetry BMI (Body Mass Index)       93 16 1.63 m (5' 4.17\") 100% 21.75 kg/m2        Blood Pressure from Last 3 Encounters:   02/22/17 (!) " 139/93   11/22/16 (!) 158/91   09/13/16 (!) 130/91    Weight from Last 3 Encounters:   02/22/17 57.8 kg (127 lb 6.8 oz)   11/22/16 59.6 kg (131 lb 6.3 oz)   09/13/16 59.9 kg (132 lb 0.9 oz)              We Performed the Following     RESPIRATORY FLOW VOLUME LOOP          Today's Medication Changes          These changes are accurate as of: 2/22/17 10:21 AM.  If you have any questions, ask your nurse or doctor.               These medicines have changed or have updated prescriptions.        Dose/Directions    COMPLETE FORMULATION  Caps   This may have changed:  Another medication with the same name was removed. Continue taking this medication, and follow the directions you see here.   Used for:  Cystic fibrosis with pulmonary manifestations (H), Vitamin D deficiency, Pancreatic insufficiency   Changed by:  Janeth Craig MD        Dose:  2 capsule   Take 2 capsules by mouth daily with food Take with food and enzymes   Quantity:  200 capsule   Refills:  3                Primary Care Provider    None Doctor, MD       No address on file        Thank you!     Thank you for choosing [facility]  for your care. Our goal is always to provide you with excellent care. Hearing back from our patients is one way we can continue to improve our services. Please take a few minutes to complete the written survey that you may receive in the mail after your visit with us. Thank you!             Your Updated Medication List - Protect others around you: Learn how to safely use, store and throw away your medicines at www.disposemymeds.org.          This list is accurate as of: 2/22/17 10:21 AM.  Always use your most recent med list.                   Brand Name Dispense Instructions for use    * albuterol (2.5 MG/3ML) 0.083% neb solution     300 vial    Nebulize 1 vial 2-4 times daily.       * Albuterol Sulfate 108 (90 BASE) MCG/ACT Aepb     1 each    Inhale 2-4 puffs into the lungs 2  times daily as needed       azithromycin 500 MG tablet    ZITHROMAX    12 tablet    Take 1 tablet (500 mg) by mouth three times a week Every Mon, Wed, Fri       aztreonam lysine 75 MG Solr    HEATHER    84 mL    Take 1 mL (75 mg) by nebulization 3 times daily Use 28 days on, 28 days off.       cholecalciferol 1000 UNIT tablet    vitamin D    100 tablet    Take 5 tablets (5,000 Units) by mouth daily       COMPLETE FORMULATION  Caps     200 capsule    Take 2 capsules by mouth daily with food Take with food and enzymes       fluticasone 50 MCG/ACT spray    FLONASE    16 g    Spray 1 spray into both nostrils daily       PULMOZYME 1 MG/ML neb solution   Generic drug:  dornase alpha     90 mL    INHALE 2.5MG VIA NEBULIZER DAILY       * sodium chloride (Inhalant) 0.9 % Aers    BRONCHO SALINE    450 mL    Inhale 5 mLs into the lungs 2 times daily Nebulize 5 ml 2-4 times daily.       * sodium chloride inhalant 7 % Nebu neb solution    HYPER-SAL    240 mL    Take 4 mLs by nebulization 2 times daily With vest therapy       * tobramycin (PF) 300 MG/5ML neb solution    ASUNCION    280 mL    Take 5 mLs (300 mg) by nebulization 2 times daily       * tobramycin 28 MG in caps    ASUNCION PODHALER    224 capsule    Inhale 4 capsules (112 mg) into the lungs 2 times daily Cycle 28 days on/off.       ZENPEP 95538 UNITS Cpep   Generic drug:  amylase-lipase-protease     360 capsule    Take 4 capsules (80,000 Units) by mouth 3 times daily (with meals) Take 3 caps with meals       * Notice:  This list has 6 medication(s) that are the same as other medications prescribed for you. Read the directions carefully, and ask your doctor or other care provider to review them with you.

## 2017-02-23 DIAGNOSIS — E84.0 CYSTIC FIBROSIS WITH PULMONARY MANIFESTATIONS (H): ICD-10-CM

## 2017-02-23 NOTE — TELEPHONE ENCOUNTER
Drug name: pulmozyme 1mg/ml  Last fill: 02/09/17  Last quantity: 225    Norma Romero  [facility]

## 2017-02-24 DIAGNOSIS — E84.0 CYSTIC FIBROSIS WITH PULMONARY MANIFESTATIONS (H): ICD-10-CM

## 2017-02-24 RX ORDER — ALBUTEROL SULFATE 0.83 MG/ML
SOLUTION RESPIRATORY (INHALATION)
Qty: 300 VIAL | Refills: 6 | Status: SHIPPED | OUTPATIENT
Start: 2017-02-24

## 2017-02-27 LAB
BACTERIA SPEC CULT: ABNORMAL
Lab: ABNORMAL
MICRO REPORT STATUS: ABNORMAL
SPECIMEN SOURCE: ABNORMAL

## 2017-03-17 ENCOUNTER — CARE COORDINATION (OUTPATIENT)
Dept: MEDSURG UNIT | Facility: CLINIC | Age: 27
End: 2017-03-17

## 2017-03-17 NOTE — PROGRESS NOTES
Writer was notified that this patient called the CF Office re: broken nebulizer. Writer called pt and pt reported nebulizer machine broke last night. She is currently in [place] on vacation and will return late Tuesday night. Pt stated she gets neb supplies through [facility]. Writer called [facility] ([phone]) spoke to Terry and gave him patient's phone number ([phone]). [facility] stated they would call Meredith and either ship her a new nebulizer or instruct her to go to a local [employer] company. I gave Terry at [facility] my direct number ([phone]) and instructed them to call me if they needed any assistance with getting her a new nebulizer machine.

## 2017-03-20 DIAGNOSIS — E84.0 CYSTIC FIBROSIS WITH PULMONARY MANIFESTATIONS (H): ICD-10-CM

## 2017-04-07 NOTE — TELEPHONE ENCOUNTER
MEDICATION APPEAL APPROVED    Medication: Sterling Chávez--appeal APPROVED  Authorization Effective Date: 2/24/2017  Authorization Expiration Date: 2/24/2018  Approved Dose/Quantity: 224 FOR 28 DAYS  Reference #:     Insurance Company:  [employer]  Expected CoPay:       CoPay Card Available:   YES   Foundation Assistance Needed:    Which Pharmacy is filling the prescription (Not needed for infusion/clinic administered): [facility] - [place], [place] - [address]

## 2017-05-17 ASSESSMENT — ACTIVITIES OF DAILY LIVING (ADL)
DO_MEMBERS_OF_YOUR_HOUSEHOLD_USE_SUNSCREEN?: Y
ARE_THERE_SMOKE_DETECTORS_IN_YOUR_HOME?: Y
ARE_THERE_CARBON_MONOXIDE_DETECTORS_IN_YOUR_HOME?: Y
DO_MEMBERS_OF_YOUR_HOUSEHOLD_WEAR_SEAT_BELTS?: Y
ARE_THERE_FIREARMS_IN_YOUR_HOME?: N
DO_MEMBERS_OF_YOUR_HOUSEHOLD_USE_SAFETY_HELMETS?: Y

## 2017-05-24 ENCOUNTER — OFFICE VISIT (OUTPATIENT)
Dept: PULMONOLOGY | Facility: CLINIC | Age: 27
End: 2017-05-24
Attending: INTERNAL MEDICINE
Payer: COMMERCIAL

## 2017-05-24 VITALS
HEART RATE: 99 BPM | OXYGEN SATURATION: 98 % | WEIGHT: 127.21 LBS | SYSTOLIC BLOOD PRESSURE: 138 MMHG | DIASTOLIC BLOOD PRESSURE: 94 MMHG | BODY MASS INDEX: 21.72 KG/M2 | RESPIRATION RATE: 16 BRPM

## 2017-05-24 DIAGNOSIS — E84.0 CYSTIC FIBROSIS WITH PULMONARY MANIFESTATIONS (H): Primary | ICD-10-CM

## 2017-05-24 DIAGNOSIS — E84.9 CYSTIC FIBROSIS (H): Primary | ICD-10-CM

## 2017-05-24 DIAGNOSIS — E84.9 CF (CYSTIC FIBROSIS) (H): ICD-10-CM

## 2017-05-24 LAB
EXPTIME-PRE: 9.56 SEC
FEF2575-%PRED-PRE: 111 %
FEF2575-PRE: 4 L/SEC
FEF2575-PRED: 3.6 L/SEC
FEFMAX-%PRED-PRE: 108 %
FEFMAX-PRE: 7.55 L/SEC
FEFMAX-PRED: 6.96 L/SEC
FEV1-%PRED-PRE: 98 %
FEV1-PRE: 3.02 L
FEV1FEV6-PRE: 89 %
FEV1FEV6-PRED: 86 %
FEV1FVC-PRE: 89 %
FEV1FVC-PRED: 87 %
FIFMAX-PRE: 6.86 L/SEC
FVC-%PRED-PRE: 95 %
FVC-PRE: 3.39 L
FVC-PRED: 3.53 L

## 2017-05-24 PROCEDURE — 87070 CULTURE OTHR SPECIMN AEROBIC: CPT | Performed by: INTERNAL MEDICINE

## 2017-05-24 ASSESSMENT — PAIN SCALES - GENERAL: PAINLEVEL: NO PAIN (0)

## 2017-05-24 NOTE — LETTER
5/24/2017       RE: Meredith Swartz  [address]  [place] [address]     Dear Colleague,    Thank you for referring your patient, Meredith Swartz, to the [facility] at [facility]. Please see a copy of my visit note below.    [facility]  May 24, 2017         Assessment and Plan:   Meredith Swartz is a 27 year old female with cystic fibrosis.    1. CF lung disease with normal spirometry:  Meredith has excellent stability in her pulmonary function as well as her pulmonary symptomatology.  She was initiated on Cayston since last being seen and does feel that this has been a good additive therapy for her.  She did have at the end of the course some hemoptysis.  This includes spotting mixed with sputum.  It lasted for about an hour.  This happened over several days.  This has not happened to her before.  She had no infectious symptoms at the time.  At this time, we did discuss the addition of vitamin K once a week while on Cayston.  She will watch these symptoms closely and if it does appear that is needed, she will contact me.  She does feel otherwise that she would like to continue with its use.  At this time, I would recommend that she continue on her present bronchial drainage and nebulized therapy.  She does continue to show evidence of Staph in her sputum.    2.  Health care maintenance.  Meredith is due for annual studies, which we will perform in June.   3.  Psychosocial.  Meredith will be moving to [place] with her significant other.  He will be attending graduate school and she is trying to get a job in the [employer].  She does have an interview the day after this visit.  She is very excited about this move.  She plans to continue her care at [facility].   4. Pancreatic Insufficiency:  The patient has no new symptoms consistent with worsening malabsorption.  The plan is to continue  the present dose of pancreatic enzymes and vitamin supplementation.    Janeth Craig MD MPH   of Medicine  Pulmonary, Allergy, Critical Care and Sleep Medicine      Interval History:     Meredith does continue to do vest therapy 2 times per day.  Her cough frequency and sputum volume are at baseline.  She does again report some hemoptysis which is resolved at this time.  She is sleeping better because of the use of Cayston.          Review of Systems:     All questionnaires were reviewed by me today with the patient.  ROS   Please see the [facility] ROS completed by patient.          Past Medical and Surgical History:     Past Medical History:   Diagnosis Date     Bronchiectasis (H)      CF (cystic fibrosis) (H)      MRSA (methicillin resistant staph aureus) culture positive      Pancreatic insufficiency      Rectal prolapse      No past surgical history on file.        Family History:     Family History   Problem Relation Age of Onset     Asthma Father      Family History Negative Mother      Asthma Sister      exercise induced      C.A.D. Paternal Grandfather      CANCER Paternal Grandmother      DIABETES Maternal Grandfather      C.A.D. Maternal Grandfather      HEART DISEASE Maternal Grandmother             Social History:     Social History     Social History     Marital status: Single     Spouse name: N/A     Number of children: N/A     Years of education: N/A     Occupational History     student [employer]     Social History Main Topics     Smoking status: Never Smoker     Smokeless tobacco: Never Used     Alcohol use No     Drug use: No     Sexual activity: Not on file     Other Topics Concern     Not on file     Social History Narrative            Medications:     Current Outpatient Prescriptions   Medication     ZENPEP 52514 UNITS CPEP per EC capsule     albuterol (2.5 MG/3ML) 0.083% neb solution     dornase alpha (PULMOZYME) 1 MG/ML neb solution     tobramycin (ASUNCOIN PODHALER) 28 MG in  caps     azithromycin (ZITHROMAX) 500 MG tablet     sodium chloride inhalant (HYPER-SAL) 7 % NEBU neb solution     Multiple Vitamins-Minerals (COMPLETE FORMULATION ) CAPS     Albuterol Sulfate 108 (90 BASE) MCG/ACT AEPB     fluticasone (FLONASE) 50 MCG/ACT nasal spray     sodium chloride, Inhalant, (BRONCHO SALINE) 0.9 % AERS     aztreonam lysine (CAYSTON) 75 MG SOLR     No current facility-administered medications for this visit.             Physical Exam:   BP (!) 138/94 (BP Location: Right arm, Patient Position: Chair, Cuff Size: Adult Regular)  Pulse 99  Resp 16  Wt 57.7 kg (127 lb 3.3 oz)  SpO2 98%  BMI 21.72 kg/m2    Constitutional:   Awake, alert and in no apparent distress     Eyes:   nonicteric     ENT:   oral mucosa moist without lesions, normal tm bilaterally, bilateral mucosal normal     Neck:   Supple without supraclavicular or cervical lymphadenopathy     Lungs:   Good air flow.  No crackles. No rhonchi.  No wheezes.     Cardiovascular:   Normal S1 and S2.  RRR.  No murmur, gallop or rub.     Abdomen:   NABS, soft, nontender, nondistended.       Musculoskeletal:   No edema, digital clubbing present     Neurologic:   Alert and conversant.     Skin:   Warm, dry.  No rash on limited exam.             Data:   All laboratory and imaging data reviewed.    Cystic Fibrosis Culture  Specimen Description   Date Value Ref Range Status   02/22/2017 Throat  Final   11/22/2016 Sputum  Final   11/22/2016 Sputum  Final   11/22/2016 Sputum  Final    Culture Micro   Date Value Ref Range Status   02/22/2017 (A)  Final    Moderate growth Normal karen  Light growth Staphylococcus pseudintermedius Susceptibility testing not routinely   done     11/22/2016 (A)  Final    Heavy growth Normal karen  Single colony Aspergillus fumigatus     11/22/2016   Final    Culture negative for acid fast bacilli  Assayed at [facility] [address]          Recent Results (from the past 168 hour(s))   General  PFT Lab (Please always keep checked)    Collection Time: 05/24/17  9:15 AM   Result Value Ref Range    FVC-Pred 3.53 L    FVC-Pre 3.39 L    FVC-%Pred-Pre 95 %    FEV1-Pre 3.02 L    FEV1-%Pred-Pre 98 %    FEV1FVC-Pred 87 %    FEV1FVC-Pre 89 %    FEFMax-Pred 6.96 L/sec    FEFMax-Pre 7.55 L/sec    FEFMax-%Pred-Pre 108 %    FEF2575-Pred 3.60 L/sec    FEF2575-Pre 4.00 L/sec    EMD2660-%Pred-Pre 111 %    ExpTime-Pre 9.56 sec    FIFMax-Pre 6.86 L/sec    FEV1FEV6-Pred 86 %    FEV1FEV6-Pre 89 %       PFT: The spirometry is normal.  When compared to 2/22/2017, the FEV1 and FVC have little change.        Again, thank you for allowing me to participate in the care of your patient.      Sincerely,    Janeth Craig MD

## 2017-05-24 NOTE — PATIENT INSTRUCTIONS
Cystic Fibrosis Self-Care Plan       MRN: [id number]   Clinic Date: May 24, 2017   Patient: Meredith Swartz     Annual Studies:   IGG   Date Value Ref Range Status   09/13/2016 1580 695 - 1620 mg/dL Final     Insulin   Date Value Ref Range Status   09/13/2016 57 mU/L Final     Comment:     Reference Range:  0-20     There are no preventive care reminders to display for this patient.      Pulmonary Function Tests  FEV1: amount of air you can blow out in 1 second  FVC: total amount of air you can take in and blow out    Your Goals:         PFT Latest Ref Rng & Units 5/24/2017   FVC L 3.39   FEV1 L 3.02   FVC% % 95   FEV1% % 98          Airway Clearance: The Most Important Way to Keep Your Lungs Healthy  Vest Settings:    Hill-Rom Frequencies: 8, 9, 10 Pressure 10 Then, Frequencies 18, 19, 20 Pressure 6      RespirTech: Quick Start with Pressure of     Do each frequency for 5 minutes; Deflate vest after each frequency & cough 3 times before beginning the next setting.    Vest and Neb Therapy should be done 2 times/day.    Good Nutrition Can Improve Lung Function and Overall Health     Take ALL of your vitamins with food     Take 1/2 of your enzymes before EVERY meal/snack and the other 1/2 mid-meal/snack    Wt Readings from Last 3 Encounters:   05/24/17 57.7 kg (127 lb 3.3 oz)   02/22/17 57.8 kg (127 lb 6.8 oz)   11/22/16 59.6 kg (131 lb 6.3 oz)       Body mass index is 21.72 kg/(m^2).         National CF Foundation Recommendations for BMI in CF Adults: Women: at least 22 Men: at least 23        Controlling Blood Sugars Helps Prevent Lung Infections & Improves Nutrition  Test blood sugar:     In the morning before eating (goal is )     2 hours after a meal (goal is less than 150)     When pre-meal glucose is greater than 150 add correction     At bedtime (if less than 100 eat a snack with 15 grams of carbohydrates  Last A1C Results:   Hemoglobin A1C   Date Value Ref Range Status   09/13/2016 5.7 4.3 - 6.0 %  Final         If diabetic, measure A1C every 6 months. Goal: Under 7%    Staying Healthy    Research:  If you are interested in learning about research opportunities or have questions, please contact Rosana Salcedo at [phone] or [email or web address].      CF Foundation:  Compass is a personalized resource service to help you with the insurance, financial, legal and other issues you are facing.  It's free, confidential and available to anyone with CF.  Ask your  for more information or contact Compass directly at 907-COMPASS ([phone]) or compass@cff.org, or learn more at cff.org/compass.          RECOMMENDATIONS: Good luck with your new life adventure.  I hope you get the [employer] job.  Annual studies in June.    YOUR GOAL:  Take care of yourself and enjoy all the new things!      CF Nurse Line:  Dwayne Long: [phone]   Maci Riley, RT: [phone]     Norma Mcmullen: [phone] or Marce Guevara, Dieticians: [phone]   Denise Meza, Diabetes Nurse: [phone]      Jana Lara: [phone] or Ophelia Mccann [phone], Social Workers   [email or web address]

## 2017-05-24 NOTE — PROGRESS NOTES
[facility]  [facility]  May 24, 2017         Assessment and Plan:   Mreedith Swartz is a 27 year old female with cystic fibrosis.    1. CF lung disease with normal spirometry:  Meredith has excellent stability in her pulmonary function as well as her pulmonary symptomatology.  She was initiated on Cayston since last being seen and does feel that this has been a good additive therapy for her.  She did have at the end of the course some hemoptysis.  This includes spotting mixed with sputum.  It lasted for about an hour.  This happened over several days.  This has not happened to her before.  She had no infectious symptoms at the time.  At this time, we did discuss the addition of vitamin K once a week while on [place].  She will watch these symptoms closely and if it does appear that is needed, she will contact me.  She does feel otherwise that she would like to continue with its use.  At this time, I would recommend that she continue on her present bronchial drainage and nebulized therapy.  She does continue to show evidence of Staph in her sputum.    2.  Health care maintenance.  Meredith is due for annual studies, which we will perform in June.   3.  Psychosocial.  Meredith will be moving to [place] with her significant other.  He will be attending graduate school and she is trying to get a job in the [employer].  She does have an interview the day after this visit.  She is very excited about this move.  She plans to continue her care at [facility].   4. Pancreatic Insufficiency:  The patient has no new symptoms consistent with worsening malabsorption.  The plan is to continue the present dose of pancreatic enzymes and vitamin supplementation.    Janeth Craig MD MPH   of Medicine  Pulmonary, Allergy, Critical Care and Sleep Medicine      Interval History:     Meredith does continue to do vest therapy 2 times per day.  Her cough frequency and sputum volume  are at baseline.  She does again report some hemoptysis which is resolved at this time.  She is sleeping better because of the use of Cayston.          Review of Systems:     All questionnaires were reviewed by me today with the patient.  ROS   Please see the [facility] ROS completed by patient.          Past Medical and Surgical History:     Past Medical History:   Diagnosis Date     Bronchiectasis (H)      CF (cystic fibrosis) (H)      MRSA (methicillin resistant staph aureus) culture positive      Pancreatic insufficiency      Rectal prolapse      No past surgical history on file.        Family History:     Family History   Problem Relation Age of Onset     Asthma Father      Family History Negative Mother      Asthma Sister      exercise induced      C.A.D. Paternal Grandfather      CANCER Paternal Grandmother      DIABETES Maternal Grandfather      C.A.D. Maternal Grandfather      HEART DISEASE Maternal Grandmother             Social History:     Social History     Social History     Marital status: Single     Spouse name: N/A     Number of children: N/A     Years of education: N/A     Occupational History     student [employer]     Social History Main Topics     Smoking status: Never Smoker     Smokeless tobacco: Never Used     Alcohol use No     Drug use: No     Sexual activity: Not on file     Other Topics Concern     Not on file     Social History Narrative            Medications:     Current Outpatient Prescriptions   Medication     ZENPEP 81624 UNITS CPEP per EC capsule     albuterol (2.5 MG/3ML) 0.083% neb solution     dornase alpha (PULMOZYME) 1 MG/ML neb solution     tobramycin (ASUNCION PODHALER) 28 MG in caps     azithromycin (ZITHROMAX) 500 MG tablet     sodium chloride inhalant (HYPER-SAL) 7 % NEBU neb solution     Multiple Vitamins-Minerals (COMPLETE FORMULATION ) CAPS     Albuterol Sulfate 108 (90 BASE) MCG/ACT AEPB     fluticasone (FLONASE) 50 MCG/ACT nasal spray     sodium chloride, Inhalant,  (BRONCHO SALINE) 0.9 % AERS     aztreonam lysine (CAYSTON) 75 MG SOLR     No current facility-administered medications for this visit.             Physical Exam:   BP (!) 138/94 (BP Location: Right arm, Patient Position: Chair, Cuff Size: Adult Regular)  Pulse 99  Resp 16  Wt 57.7 kg (127 lb 3.3 oz)  SpO2 98%  BMI 21.72 kg/m2    Constitutional:   Awake, alert and in no apparent distress     Eyes:   nonicteric     ENT:   oral mucosa moist without lesions, normal tm bilaterally, bilateral mucosal normal     Neck:   Supple without supraclavicular or cervical lymphadenopathy     Lungs:   Good air flow.  No crackles. No rhonchi.  No wheezes.     Cardiovascular:   Normal S1 and S2.  RRR.  No murmur, gallop or rub.     Abdomen:   NABS, soft, nontender, nondistended.       Musculoskeletal:   No edema, digital clubbing present     Neurologic:   Alert and conversant.     Skin:   Warm, dry.  No rash on limited exam.             Data:   All laboratory and imaging data reviewed.    Cystic Fibrosis Culture  Specimen Description   Date Value Ref Range Status   02/22/2017 Throat  Final   11/22/2016 Sputum  Final   11/22/2016 Sputum  Final   11/22/2016 Sputum  Final    Culture Micro   Date Value Ref Range Status   02/22/2017 (A)  Final    Moderate growth Normal karen  Light growth Staphylococcus pseudintermedius Susceptibility testing not routinely   done     11/22/2016 (A)  Final    Heavy growth Normal karen  Single colony Aspergillus fumigatus     11/22/2016   Final    Culture negative for acid fast bacilli  Assayed at [facility] [address]          Recent Results (from the past 168 hour(s))   General PFT Lab (Please always keep checked)    Collection Time: 05/24/17  9:15 AM   Result Value Ref Range    FVC-Pred 3.53 L    FVC-Pre 3.39 L    FVC-%Pred-Pre 95 %    FEV1-Pre 3.02 L    FEV1-%Pred-Pre 98 %    FEV1FVC-Pred 87 %    FEV1FVC-Pre 89 %    FEFMax-Pred 6.96 L/sec    FEFMax-Pre 7.55 L/sec     FEFMax-%Pred-Pre 108 %    FEF2575-Pred 3.60 L/sec    FEF2575-Pre 4.00 L/sec    CRN0425-%Pred-Pre 111 %    ExpTime-Pre 9.56 sec    FIFMax-Pre 6.86 L/sec    FEV1FEV6-Pred 86 %    FEV1FEV6-Pre 89 %       PFT: The spirometry is normal.  When compared to 2/22/2017, the FEV1 and FVC have little change.

## 2017-05-24 NOTE — MR AVS SNAPSHOT
After Visit Summary   5/24/2017    Meredith Swartz    MRN: [id number]           Patient Information     Date Of Birth          1990        Visit Information        Provider Department      5/24/2017 9:10 AM Janeth Craig MD [facility]        Today's Diagnoses     Cystic fibrosis with pulmonary manifestations (H)    -  1    CF (cystic fibrosis) (H)          Care Instructions    Cystic Fibrosis Self-Care Plan       MRN: [id number]   Clinic Date: May 24, 2017   Patient: Meredith Swartz     Annual Studies:   IGG   Date Value Ref Range Status   09/13/2016 1580 695 - 1620 mg/dL Final     Insulin   Date Value Ref Range Status   09/13/2016 57 mU/L Final     Comment:     Reference Range:  0-20     There are no preventive care reminders to display for this patient.      Pulmonary Function Tests  FEV1: amount of air you can blow out in 1 second  FVC: total amount of air you can take in and blow out    Your Goals:         PFT Latest Ref Rng & Units 5/24/2017   FVC L 3.39   FEV1 L 3.02   FVC% % 95   FEV1% % 98          Airway Clearance: The Most Important Way to Keep Your Lungs Healthy  Vest Settings:    Hill-Rom Frequencies: 8, 9, 10 Pressure 10 Then, Frequencies 18, 19, 20 Pressure 6      RespirTech: Quick Start with Pressure of     Do each frequency for 5 minutes; Deflate vest after each frequency & cough 3 times before beginning the next setting.    Vest and Neb Therapy should be done 2 times/day.    Good Nutrition Can Improve Lung Function and Overall Health     Take ALL of your vitamins with food     Take 1/2 of your enzymes before EVERY meal/snack and the other 1/2 mid-meal/snack    Wt Readings from Last 3 Encounters:   05/24/17 57.7 kg (127 lb 3.3 oz)   02/22/17 57.8 kg (127 lb 6.8 oz)   11/22/16 59.6 kg (131 lb 6.3 oz)       Body mass index is 21.72 kg/(m^2).         National CF Foundation Recommendations for BMI in CF Adults: Women: at least 22 Men: at  least 23        Controlling Blood Sugars Helps Prevent Lung Infections & Improves Nutrition  Test blood sugar:     In the morning before eating (goal is )     2 hours after a meal (goal is less than 150)     When pre-meal glucose is greater than 150 add correction     At bedtime (if less than 100 eat a snack with 15 grams of carbohydrates  Last A1C Results:   Hemoglobin A1C   Date Value Ref Range Status   09/13/2016 5.7 4.3 - 6.0 % Final         If diabetic, measure A1C every 6 months. Goal: Under 7%    Staying Healthy    Research:  If you are interested in learning about research opportunities or have questions, please contact Rosana Salcedo at [phone] or [email or web address].      CF Foundation:  Compass is a personalized resource service to help you with the insurance, financial, legal and other issues you are facing.  It's free, confidential and available to anyone with CF.  Ask your  for more information or contact Compass directly at [facility] ([phone]) or compass@cff.org, or learn more at [email or web address]/compass.          RECOMMENDATIONS: Good luck with your new life adventure.  I hope you get the [employer] job.  Annual studies in June.    YOUR GOAL:  Take care of yourself and enjoy all the new things!      CF Nurse Line:  Dwayne Long: [phone]   Maci Riley, RT: [phone]     Norma Mcmullen: [phone] or Marce Guevara, Dieticians: [phone]   Denise Meza, Diabetes Nurse: [phone]      Jana Lara: [phone] or Ophelia Mccann [phone], Social Workers   [email or web address]                   Follow-ups after your visit        Follow-up notes from your care team     Return in about 3 months (around 8/24/2017), or annual studies in June.      Your next 10 appointments already scheduled     Jun 19, 2017  8:00 AM CDT   PFT VISIT with CELINE PFL [facility] ([facility])    [address] [place] [address]  Floor  [facility] [address]   [phone]            Jun 19, 2017  8:15 AM CDT   (Arrive by 8:00 AM)   XR CHEST 2 VIEWS with UCXR2   [facility] [place] [facility] ([facility])    [address]  [address] [facility] [address]   [phone]           Please bring a list of your current medicines to your exam. (Include vitamins, minerals and over-thecounter medicines.) Leave your valuables at home.  Tell your doctor if there is a chance you may be pregnant.  You do not need to do anything special for this exam.            Jun 19, 2017  8:30 AM CDT   DX HIP/PELVIS/SPINE with UCDX1   [facility] ([facility])    [address]  [address] [facility] [address]   [phone]           Please do not take any of the following 24 hours prior to the day of your exam: vitamins, calcium tablets, antacids.            Jun 19, 2017  9:00 AM CDT   Infusion 180 with UC SPEC INFUSION, UC 48 ATC   [facility] ([facility])    [address]  [address] [facility] [address]   [phone]            Jun 28, 2017  8:30 AM CDT   (Arrive by 8:15 AM)   RETURN CYSTIC FIBROSIS VISIT with Janeth Craig MD   [facility] ([facility])    [address] [place] [address]  [address] [facility] [address]   [phone]              Future tests that were ordered for you today     Open Future Orders        Priority Expected Expires Ordered    Lipid Profile Routine 6/24/2017 5/24/2018 5/24/2017    Albumin level Routine 6/24/2017 5/24/2018 5/24/2017    Alkaline phosphatase Routine 6/24/2017 5/24/2018 5/24/2017    AST Routine 6/24/2017 5/24/2018 5/24/2017    Iron Routine 6/24/2017 5/24/2018 5/24/2017    GGT Routine 6/24/2017 5/24/2018 5/24/2017    Hemoglobin A1c Routine 6/24/2017 5/24/2018 5/24/2017    IgA Routine  6/24/2017 5/24/2018 5/24/2017    IgG Routine 6/24/2017 5/24/2018 5/24/2017    IgM Routine 6/24/2017 5/24/2018 5/24/2017    IgE Routine 6/24/2017 5/24/2018 5/24/2017    INR Routine 6/24/2017 5/24/2018 5/24/2017    Magnesium Routine 6/24/2017 5/24/2018 5/24/2017    Phosphorus Routine 6/24/2017 5/24/2018 5/24/2017    Protein total Routine 6/24/2017 5/24/2018 5/24/2017    TSH with free T4 reflex Routine 6/24/2017 5/24/2018 5/24/2017    Vitamin A Routine 6/24/2017 5/24/2018 5/24/2017    Vitamin E Routine 6/24/2017 5/24/2018 5/24/2017    Vitamin D Deficiency Routine 6/24/2017 5/24/2018 5/24/2017    CBC with platelets differential Routine 6/24/2017 5/24/2018 5/24/2017    Erythrocyte sedimentation rate auto Routine 6/24/2017 5/24/2018 5/24/2017    Routine UA with microscopic Routine 6/24/2017 5/24/2018 5/24/2017    Albumin Random Urine Quantitative Routine 6/24/2017 5/24/2018 5/24/2017    Nocardia culture Routine 6/24/2017 5/24/2018 5/24/2017    Fungus Culture, non-blood Routine 6/24/2017 5/24/2018 5/24/2017    AFB Stain Non Blood Routine 6/24/2017 5/24/2018 5/24/2017    AFB Culture Non Blood Routine 6/24/2017 5/24/2018 5/24/2017    Cystic Fibrosis Culture Aerob Bacterial Routine 6/24/2017 5/24/2018 5/24/2017    Spirometry, Breathing Capacity Routine 6/24/2017 5/24/2018 5/24/2017    Dexa hip/pelvis/spine* Routine 6/24/2017 5/24/2018 5/24/2017    ALT Routine 6/24/2017 5/24/2018 5/24/2017    Basic metabolic panel Routine 6/24/2017 5/24/2018 5/24/2017            Who to contact     If you have questions or need follow up information about today's clinic visit or your schedule please contact [facility] directly at [phone].  Normal or non-critical lab and imaging results will be communicated to you by MyChart, letter or phone within 4 business days after the clinic has received the results. If you do not hear from us within 7 days, please contact the clinic through MyChart or phone. If you  have a critical or abnormal lab result, we will notify you by phone as soon as possible.  Submit refill requests through [employer] or call your pharmacy and they will forward the refill request to us. Please allow 3 business days for your refill to be completed.          Additional Information About Your Visit        [employer] Information     [employer] gives you secure access to your electronic health record. If you see a primary care provider, you can also send messages to your care team and make appointments. If you have questions, please call your primary care clinic.  If you do not have a primary care provider, please call [phone] and they will assist you.        Care EveryWhere ID     This is your Care EveryWhere ID. This could be used by other organizations to access your [place] medical records  [id number]        Your Vitals Were     Pulse Respirations Pulse Oximetry BMI (Body Mass Index)          99 16 98% 21.72 kg/m2         Blood Pressure from Last 3 Encounters:   05/24/17 (!) 138/94   02/22/17 (!) 139/93   11/22/16 (!) 158/91    Weight from Last 3 Encounters:   05/24/17 57.7 kg (127 lb 3.3 oz)   02/22/17 57.8 kg (127 lb 6.8 oz)   11/22/16 59.6 kg (131 lb 6.3 oz)              We Performed the Following     Cystic Fibrosis Culture Aerob Bacterial        Primary Care Provider    None Doctor, MD       No address on file        Thank you!     Thank you for choosing [facility]  for your care. Our goal is always to provide you with excellent care. Hearing back from our patients is one way we can continue to improve our services. Please take a few minutes to complete the written survey that you may receive in the mail after your visit with us. Thank you!             Your Updated Medication List - Protect others around you: Learn how to safely use, store and throw away your medicines at www.disposemymeds.org.          This list is accurate as of: 5/24/17 10:31 AM.  Always use  your most recent med list.                   Brand Name Dispense Instructions for use    * Albuterol Sulfate 108 (90 BASE) MCG/ACT Aepb     1 each    Inhale 2-4 puffs into the lungs 2 times daily as needed       * albuterol (2.5 MG/3ML) 0.083% neb solution     300 vial    Nebulize 1 vial 2-4 times daily.       azithromycin 500 MG tablet    ZITHROMAX    12 tablet    Take 1 tablet (500 mg) by mouth three times a week Every Mon, Wed, Fri       aztreonam lysine 75 MG Solr    CAYSTON    84 mL    Take 1 mL (75 mg) by nebulization 3 times daily Use 28 days on, 28 days off.       COMPLETE FORMULATION  Caps     200 capsule    Take 2 capsules by mouth daily with food Take with food and enzymes       dornase alpha 1 MG/ML neb solution    PULMOZYME    75 mL    INHALE 2.5MG VIA NEBULIZER DAILY       fluticasone 50 MCG/ACT spray    FLONASE    16 g    Spray 1 spray into both nostrils daily       * sodium chloride (Inhalant) 0.9 % Aers    BRONCHO SALINE    450 mL    Inhale 5 mLs into the lungs 2 times daily Nebulize 5 ml 2-4 times daily.       * sodium chloride inhalant 7 % Nebu neb solution    HYPER-SAL    240 mL    Take 4 mLs by nebulization 2 times daily With vest therapy       tobramycin 28 MG in caps    ASUNCION PODHALER    224 capsule    Inhale 4 capsules (112 mg) into the lungs 2 times daily Cycle 28 days on/off.       ZENPEP 27664 UNITS Cpep   Generic drug:  amylase-lipase-protease     360 capsule    Take 4 capsules (80,000 Units) by mouth 3 times daily (with meals) Take 3 caps with meals       * Notice:  This list has 4 medication(s) that are the same as other medications prescribed for you. Read the directions carefully, and ask your doctor or other care provider to review them with you.

## 2017-05-29 LAB
BACTERIA SPEC CULT: NORMAL
MICRO REPORT STATUS: NORMAL
SPECIMEN SOURCE: NORMAL

## 2017-06-07 DIAGNOSIS — E84.0 CYSTIC FIBROSIS WITH PULMONARY MANIFESTATIONS (H): ICD-10-CM

## 2017-06-09 DIAGNOSIS — E84.9 CF (CYSTIC FIBROSIS) (H): Primary | ICD-10-CM

## 2017-06-14 ASSESSMENT — ENCOUNTER SYMPTOMS
DYSPNEA ON EXERTION: 0
COUGH DISTURBING SLEEP: 1
POSTURAL DYSPNEA: 0
SHORTNESS OF BREATH: 0
SPUTUM PRODUCTION: 1
SNORES LOUDLY: 0
RESPIRATORY PAIN: 0
COUGH: 1
WHEEZING: 0
HEMOPTYSIS: 0

## 2017-06-14 ASSESSMENT — ACTIVITIES OF DAILY LIVING (ADL)
DO_MEMBERS_OF_YOUR_HOUSEHOLD_USE_SAFETY_HELMETS?: Y
ARE_THERE_FIREARMS_IN_YOUR_HOME?: N
DO_MEMBERS_OF_YOUR_HOUSEHOLD_USE_SUNSCREEN?: Y
DO_MEMBERS_OF_YOUR_HOUSEHOLD_WEAR_SEAT_BELTS?: Y
ARE_THERE_CARBON_MONOXIDE_DETECTORS_IN_YOUR_HOME?: Y
ARE_THERE_SMOKE_DETECTORS_IN_YOUR_HOME?: Y

## 2017-06-19 ENCOUNTER — INFUSION THERAPY VISIT (OUTPATIENT)
Dept: INFUSION THERAPY | Facility: CLINIC | Age: 27
End: 2017-06-19
Attending: INTERNAL MEDICINE
Payer: COMMERCIAL

## 2017-06-19 VITALS
HEART RATE: 79 BPM | TEMPERATURE: 98.5 F | WEIGHT: 126.6 LBS | BODY MASS INDEX: 21.61 KG/M2 | SYSTOLIC BLOOD PRESSURE: 128 MMHG | DIASTOLIC BLOOD PRESSURE: 87 MMHG | OXYGEN SATURATION: 99 %

## 2017-06-19 DIAGNOSIS — E84.0 CYSTIC FIBROSIS WITH PULMONARY MANIFESTATIONS (H): ICD-10-CM

## 2017-06-19 DIAGNOSIS — E84.9 CF (CYSTIC FIBROSIS) (H): ICD-10-CM

## 2017-06-19 DIAGNOSIS — E84.0 CYSTIC FIBROSIS WITH PULMONARY MANIFESTATIONS (H): Primary | ICD-10-CM

## 2017-06-19 LAB
ALBUMIN SERPL-MCNC: 3.7 G/DL (ref 3.4–5)
ALBUMIN UR-MCNC: NEGATIVE MG/DL
ALP SERPL-CCNC: 67 U/L (ref 40–150)
ALT SERPL W P-5'-P-CCNC: 36 U/L (ref 0–50)
ANION GAP SERPL CALCULATED.3IONS-SCNC: 8 MMOL/L (ref 3–14)
APPEARANCE UR: ABNORMAL
AST SERPL W P-5'-P-CCNC: 28 U/L (ref 0–45)
BACTERIA #/AREA URNS HPF: ABNORMAL /HPF
BASOPHILS # BLD AUTO: 0 10E9/L (ref 0–0.2)
BASOPHILS NFR BLD AUTO: 0.3 %
BILIRUB UR QL STRIP: NEGATIVE
BUN SERPL-MCNC: 11 MG/DL (ref 7–30)
CALCIUM SERPL-MCNC: 8.5 MG/DL (ref 8.5–10.1)
CHLORIDE SERPL-SCNC: 106 MMOL/L (ref 94–109)
CHOLEST SERPL-MCNC: 113 MG/DL
CO2 SERPL-SCNC: 23 MMOL/L (ref 20–32)
COLOR UR AUTO: YELLOW
CREAT SERPL-MCNC: 0.74 MG/DL (ref 0.52–1.04)
CREAT UR-MCNC: 130 MG/DL
DEPRECATED CALCIDIOL+CALCIFEROL SERPL-MC: 22 UG/L (ref 20–75)
DIFFERENTIAL METHOD BLD: NORMAL
EOSINOPHIL # BLD AUTO: 0.1 10E9/L (ref 0–0.7)
EOSINOPHIL NFR BLD AUTO: 2 %
ERYTHROCYTE [DISTWIDTH] IN BLOOD BY AUTOMATED COUNT: 11.6 % (ref 10–15)
ERYTHROCYTE [SEDIMENTATION RATE] IN BLOOD BY WESTERGREN METHOD: 34 MM/H (ref 0–20)
EXPTIME-PRE: 10.46 SEC
FEF2575-%PRED-PRE: 111 %
FEF2575-PRE: 4.01 L/SEC
FEF2575-PRED: 3.6 L/SEC
FEFMAX-%PRED-PRE: 111 %
FEFMAX-PRE: 7.78 L/SEC
FEFMAX-PRED: 6.96 L/SEC
FEV1-%PRED-PRE: 99 %
FEV1-PRE: 3.05 L
FEV1FEV6-PRE: 89 %
FEV1FEV6-PRED: 86 %
FEV1FVC-PRE: 89 %
FEV1FVC-PRED: 87 %
FIFMAX-PRE: 7.03 L/SEC
FVC-%PRED-PRE: 96 %
FVC-PRE: 3.41 L
FVC-PRED: 3.53 L
GFR SERPL CREATININE-BSD FRML MDRD: NORMAL ML/MIN/1.7M2
GGT SERPL-CCNC: 11 U/L (ref 0–40)
GLUCOSE SERPL-MCNC: 118 MG/DL (ref 70–99)
GLUCOSE SERPL-MCNC: 133 MG/DL (ref 70–99)
GLUCOSE SERPL-MCNC: 152 MG/DL (ref 70–99)
GLUCOSE SERPL-MCNC: 180 MG/DL (ref 70–99)
GLUCOSE SERPL-MCNC: 83 MG/DL (ref 70–99)
GLUCOSE SERPL-MCNC: 86 MG/DL (ref 70–99)
GLUCOSE UR STRIP-MCNC: NEGATIVE MG/DL
HBA1C MFR BLD: 5.5 % (ref 4.3–6)
HCT VFR BLD AUTO: 35.6 % (ref 35–47)
HDLC SERPL-MCNC: 36 MG/DL
HGB BLD-MCNC: 12 G/DL (ref 11.7–15.7)
HGB UR QL STRIP: NEGATIVE
HYALINE CASTS #/AREA URNS LPF: 1 /LPF (ref 0–2)
IGA SERPL-MCNC: 316 MG/DL (ref 70–380)
IGG SERPL-MCNC: 1650 MG/DL (ref 695–1620)
IGM SERPL-MCNC: 80 MG/DL (ref 60–265)
IMM GRANULOCYTES # BLD: 0 10E9/L (ref 0–0.4)
IMM GRANULOCYTES NFR BLD: 0.6 %
INR PPP: 1.16 (ref 0.86–1.14)
INSULIN SERPL-ACNC: 33.3 MU/L (ref 3–25)
INSULIN SERPL-ACNC: 53.1 MU/L (ref 3–25)
INSULIN SERPL-ACNC: 54.7 MU/L (ref 3–25)
INSULIN SERPL-ACNC: 62 MU/L (ref 3–25)
INSULIN SERPL-ACNC: 8.4 MU/L (ref 3–25)
IRON SERPL-MCNC: 95 UG/DL (ref 35–180)
KETONES UR STRIP-MCNC: NEGATIVE MG/DL
LDLC SERPL CALC-MCNC: 53 MG/DL
LEUKOCYTE ESTERASE UR QL STRIP: NEGATIVE
LYMPHOCYTES # BLD AUTO: 2 10E9/L (ref 0.8–5.3)
LYMPHOCYTES NFR BLD AUTO: 30.3 %
MAGNESIUM SERPL-MCNC: 1.9 MG/DL (ref 1.6–2.3)
MCH RBC QN AUTO: 29.9 PG (ref 26.5–33)
MCHC RBC AUTO-ENTMCNC: 33.7 G/DL (ref 31.5–36.5)
MCV RBC AUTO: 89 FL (ref 78–100)
MICROALBUMIN UR-MCNC: 31 MG/L
MICROALBUMIN/CREAT UR: 24 MG/G CR (ref 0–25)
MONOCYTES # BLD AUTO: 0.5 10E9/L (ref 0–1.3)
MONOCYTES NFR BLD AUTO: 7.8 %
MUCOUS THREADS #/AREA URNS LPF: PRESENT /LPF
NEUTROPHILS # BLD AUTO: 3.9 10E9/L (ref 1.6–8.3)
NEUTROPHILS NFR BLD AUTO: 59 %
NITRATE UR QL: NEGATIVE
NONHDLC SERPL-MCNC: 78 MG/DL
NRBC # BLD AUTO: 0 10*3/UL
NRBC BLD AUTO-RTO: 0 /100
PH UR STRIP: 5 PH (ref 5–7)
PHOSPHATE SERPL-MCNC: 2.3 MG/DL (ref 2.5–4.5)
PLATELET # BLD AUTO: 209 10E9/L (ref 150–450)
POTASSIUM SERPL-SCNC: 3.6 MMOL/L (ref 3.4–5.3)
PROT SERPL-MCNC: 7.9 G/DL (ref 6.8–8.8)
RBC # BLD AUTO: 4.01 10E12/L (ref 3.8–5.2)
RBC #/AREA URNS AUTO: 1 /HPF (ref 0–2)
SODIUM SERPL-SCNC: 137 MMOL/L (ref 133–144)
SP GR UR STRIP: 1.01 (ref 1–1.03)
SQUAMOUS #/AREA URNS AUTO: 2 /HPF (ref 0–1)
T4 FREE SERPL-MCNC: 0.9 NG/DL (ref 0.76–1.46)
TRIGL SERPL-MCNC: 124 MG/DL
TSH SERPL DL<=0.005 MIU/L-ACNC: 7.77 MU/L (ref 0.4–4)
URN SPEC COLLECT METH UR: ABNORMAL
UROBILINOGEN UR STRIP-MCNC: 0 MG/DL (ref 0–2)
WBC # BLD AUTO: 6.6 10E9/L (ref 4–11)
WBC #/AREA URNS AUTO: 3 /HPF (ref 0–2)

## 2017-06-19 PROCEDURE — 84439 ASSAY OF FREE THYROXINE: CPT | Performed by: INTERNAL MEDICINE

## 2017-06-19 PROCEDURE — 85610 PROTHROMBIN TIME: CPT | Performed by: INTERNAL MEDICINE

## 2017-06-19 PROCEDURE — 82977 ASSAY OF GGT: CPT | Performed by: INTERNAL MEDICINE

## 2017-06-19 PROCEDURE — 82784 ASSAY IGA/IGD/IGG/IGM EACH: CPT | Performed by: INTERNAL MEDICINE

## 2017-06-19 PROCEDURE — 83036 HEMOGLOBIN GLYCOSYLATED A1C: CPT | Performed by: INTERNAL MEDICINE

## 2017-06-19 PROCEDURE — 36415 COLL VENOUS BLD VENIPUNCTURE: CPT | Performed by: INTERNAL MEDICINE

## 2017-06-19 PROCEDURE — 84446 ASSAY OF VITAMIN E: CPT | Performed by: INTERNAL MEDICINE

## 2017-06-19 PROCEDURE — 83525 ASSAY OF INSULIN: CPT | Performed by: INTERNAL MEDICINE

## 2017-06-19 PROCEDURE — 82306 VITAMIN D 25 HYDROXY: CPT | Performed by: INTERNAL MEDICINE

## 2017-06-19 PROCEDURE — 83540 ASSAY OF IRON: CPT | Performed by: INTERNAL MEDICINE

## 2017-06-19 PROCEDURE — 84450 TRANSFERASE (AST) (SGOT): CPT | Performed by: INTERNAL MEDICINE

## 2017-06-19 PROCEDURE — 82947 ASSAY GLUCOSE BLOOD QUANT: CPT | Performed by: INTERNAL MEDICINE

## 2017-06-19 PROCEDURE — 84443 ASSAY THYROID STIM HORMONE: CPT | Performed by: INTERNAL MEDICINE

## 2017-06-19 PROCEDURE — 83735 ASSAY OF MAGNESIUM: CPT | Performed by: INTERNAL MEDICINE

## 2017-06-19 PROCEDURE — 84155 ASSAY OF PROTEIN SERUM: CPT | Performed by: INTERNAL MEDICINE

## 2017-06-19 PROCEDURE — 82785 ASSAY OF IGE: CPT | Performed by: INTERNAL MEDICINE

## 2017-06-19 PROCEDURE — 81001 URINALYSIS AUTO W/SCOPE: CPT | Performed by: INTERNAL MEDICINE

## 2017-06-19 PROCEDURE — 84460 ALANINE AMINO (ALT) (SGPT): CPT | Performed by: INTERNAL MEDICINE

## 2017-06-19 PROCEDURE — 25000125 ZZHC RX 250: Mod: ZF | Performed by: INTERNAL MEDICINE

## 2017-06-19 PROCEDURE — 82043 UR ALBUMIN QUANTITATIVE: CPT | Performed by: INTERNAL MEDICINE

## 2017-06-19 PROCEDURE — 84075 ASSAY ALKALINE PHOSPHATASE: CPT | Performed by: INTERNAL MEDICINE

## 2017-06-19 PROCEDURE — 85025 COMPLETE CBC W/AUTO DIFF WBC: CPT | Performed by: INTERNAL MEDICINE

## 2017-06-19 PROCEDURE — 80061 LIPID PANEL: CPT | Performed by: INTERNAL MEDICINE

## 2017-06-19 PROCEDURE — 85652 RBC SED RATE AUTOMATED: CPT | Performed by: INTERNAL MEDICINE

## 2017-06-19 PROCEDURE — 80069 RENAL FUNCTION PANEL: CPT | Performed by: INTERNAL MEDICINE

## 2017-06-19 PROCEDURE — 84590 ASSAY OF VITAMIN A: CPT | Performed by: INTERNAL MEDICINE

## 2017-06-19 RX ADMIN — ALCOHOL 75 G: 65 GEL TOPICAL at 09:10

## 2017-06-19 NOTE — MR AVS SNAPSHOT
After Visit Summary   6/19/2017    Meredith Swartz    MRN: [id number]           Patient Information     Date Of Birth          1990        Visit Information        Provider Department      6/19/2017 9:00 AM UC 48 ATC; UC SPEC INFUSION [facility] Specialty and Procedure        Today's Diagnoses     Cystic fibrosis with pulmonary manifestations (H)    -  1    CF (cystic fibrosis) (H)           Follow-ups after your visit        Your next 10 appointments already scheduled     Jun 28, 2017  8:30 AM CDT   (Arrive by 8:15 AM)   RETURN CYSTIC FIBROSIS VISIT with Janeth Craig MD   [facility] ([facility])    [address]  3rd Floor  [facility] [address] [phone]              Who to contact     If you have questions or need follow up information about today's clinic visit or your schedule please contact [facility] SPECIALTY AND PROCEDURE directly at [phone].  Normal or non-critical lab and imaging results will be communicated to you by [employer], letter or phone within 4 business days after the clinic has received the results. If you do not hear from us within 7 days, please contact the clinic through [employer] or phone. If you have a critical or abnormal lab result, we will notify you by phone as soon as possible.  Submit refill requests through "[employer] or call your pharmacy and they will forward the refill request to us. Please allow 3 business days for your refill to be completed.          Additional Information About Your Visit        MyChart Information     "[employer] gives you secure access to your electronic health record. If you see a primary care provider, you can also send messages to your care team and make appointments. If you have questions, please call your primary care clinic.  If you do not have a primary care provider, please call [phone] and they will  assist you.        Care EveryWhere ID     This is your Care EveryWhere ID. This could be used by other organizations to access your [place] medical records  [id number]        Your Vitals Were     Pulse Temperature Pulse Oximetry BMI (Body Mass Index)          79 98.5  F (36.9  C) (Oral) 99% 21.61 kg/m2         Blood Pressure from Last 3 Encounters:   06/19/17 128/87   05/24/17 (!) 138/94   02/22/17 (!) 139/93    Weight from Last 3 Encounters:   06/19/17 57.4 kg (126 lb 9.6 oz)   05/24/17 57.7 kg (127 lb 3.3 oz)   02/22/17 57.8 kg (127 lb 6.8 oz)              We Performed the Following     Albumin level     Albumin Random Urine Quantitative     Alkaline phosphatase     ALT     AST     Basic metabolic panel     CBC with platelets differential     Erythrocyte sedimentation rate auto     GGT     Glucose in a Series: Draw +120 minutes     Glucose in a Series: Draw +30 minutes     Glucose in a Series: Draw +60 minutes     Glucose in a Series: Draw +90 minutes     Glucose in a Series: Draw Time Zero     Hemoglobin A1c     IgA     IgE     IgG     IgM     INR     Insulin in a Series: Draw +120 minutes     Insulin in a Series: Draw +30 minutes     Insulin in a Series: Draw +60 minutes     Insulin in a Series: Draw +90 minutes     Insulin in a Series: Draw Time Zero     Iron     Lipid Profile     Magnesium     Phosphorus     Protein total     Routine UA with microscopic     T4 free     TSH with free T4 reflex     Vitamin A     Vitamin D Deficiency     Vitamin E        Primary Care Provider    None Doctor, MD       No address on file        Thank you!     Thank you for choosing [facility] SPECIAL[facility] AND PROCEDURE  for your care. Our goal is always to provide you with excellent care. Hearing back from our patients is one way we can continue to improve our services. Please take a few minutes to complete the written survey that you may receive in the mail after your visit with us. Thank you!              Your Updated Medication List - Protect others around you: Learn how to safely use, store and throw away your medicines at www.disposemymeds.org.          This list is accurate as of: 6/19/17 11:21 AM.  Always use your most recent med list.                   Brand Name Dispense Instructions for use    * albuterol (2.5 MG/3ML) 0.083% neb solution     300 vial    Nebulize 1 vial 2-4 times daily.       * Albuterol Sulfate 108 (90 BASE) MCG/ACT Aepb     1 each    Inhale 2-4 puffs into the lungs 2 times daily as needed       azithromycin 500 MG tablet    ZITHROMAX    12 tablet    Take 1 tablet (500 mg) by mouth three times a week Every Mon, Wed, Fri       aztreonam lysine 75 MG Solr    CAYSTON    84 mL    Take 1 mL (75 mg) by nebulization 3 times daily Use 28 days on, 28 days off.       COMPLETE FORMULATION  Caps     200 capsule    Take 2 capsules by mouth daily with food Take with food and enzymes       dornase alpha 1 MG/ML neb solution    PULMOZYME    75 mL    INHALE 2.5MG VIA NEBULIZER DAILY       fluticasone 50 MCG/ACT spray    FLONASE    16 g    Spray 1 spray into both nostrils daily       * sodium chloride (Inhalant) 0.9 % Aers    BRONCHO SALINE    450 mL    Inhale 5 mLs into the lungs 2 times daily Nebulize 5 ml 2-4 times daily.       * sodium chloride inhalant 7 % Nebu neb solution    HYPER-SAL    240 mL    Take 4 mLs by nebulization 2 times daily With vest therapy       tobramycin 28 MG in caps    ASUNCION PODHALER    224 capsule    Inhale 4 capsules (112 mg) into the lungs 2 times daily Cycle 28 days on/off.       ZENPEP 78443 UNITS Cpep   Generic drug:  amylase-lipase-protease     360 capsule    Take 4 capsules (80,000 Units) by mouth 3 times daily (with meals) Take 3 caps with meals       * Notice:  This list has 4 medication(s) that are the same as other medications prescribed for you. Read the directions carefully, and ask your doctor or other care provider to review them with you.

## 2017-06-19 NOTE — PROGRESS NOTES
Meredith is here for a glucose tolerance test for a history of Cystic Fibrosis.  Orders written by Dr. Janeth Craig. Name and  verified. Vitals taken and entered into flowsheets.  Meredith arrived NPO, last eating at 9:20 pm 17.  IV placed without difficulty by CORTES Reich CMA.  Requested labs drawn.  Glucose and Insulin levels drawn at -0- 9:10, +30 9:40, +60 10:10, +90 10:40, and + 120 11:10.  Meredith started drinking the Glucola at 9:10 and completed within 10 minutes.  Post blood sugar tested and was 122.  Snack given to patient prior to discharge.   PIV discontinued without difficulty.    Meredith will follow up, as planned, with his and her CF team for test results and all future appointments.   Evie Reich CMA

## 2017-06-22 LAB
A-TOCOPHEROL VIT E SERPL-MCNC: 9.8
ANNOTATION COMMENT IMP: NORMAL
BETA+GAMMA TOCOPHEROL SERPL-MCNC: 0.5 MG/DL
IGE SERPL-ACNC: 39 KIU/L (ref 0–114)
RETINYL PALMITATE SERPL-MCNC: NORMAL UG/ML
VIT A SERPL-MCNC: 0.37 UG/ML

## 2017-06-28 ENCOUNTER — OFFICE VISIT (OUTPATIENT)
Dept: PULMONOLOGY | Facility: CLINIC | Age: 27
End: 2017-06-28
Attending: INTERNAL MEDICINE
Payer: COMMERCIAL

## 2017-06-28 VITALS
WEIGHT: 126.9 LBS | HEIGHT: 65 IN | SYSTOLIC BLOOD PRESSURE: 123 MMHG | HEART RATE: 101 BPM | OXYGEN SATURATION: 94 % | BODY MASS INDEX: 21.14 KG/M2 | DIASTOLIC BLOOD PRESSURE: 89 MMHG

## 2017-06-28 DIAGNOSIS — E84.0 CYSTIC FIBROSIS WITH PULMONARY MANIFESTATIONS (H): Primary | ICD-10-CM

## 2017-06-28 PROCEDURE — 99211 OFF/OP EST MAY X REQ PHY/QHP: CPT | Mod: ZF

## 2017-06-28 ASSESSMENT — PAIN SCALES - GENERAL: PAINLEVEL: NO PAIN (0)

## 2017-06-28 NOTE — PATIENT INSTRUCTIONS
Cystic Fibrosis Self-Care Plan       MRN: [id number]   Clinic Date: June 28, 2017   Patient: Meredith Swartz     Annual Studies:   IGG   Date Value Ref Range Status   06/19/2017 1650 (H) 695 - 1620 mg/dL Final     Insulin   Date Value Ref Range Status   06/19/2017 53.1 (H) 3 - 25 mU/L Final     There are no preventive care reminders to display for this patient.      Pulmonary Function Tests  FEV1: amount of air you can blow out in 1 second  FVC: total amount of air you can take in and blow out    Your Goals:         PFT Latest Ref Rng & Units 6/19/2017   FVC L 3.41   FEV1 L 3.05   FVC% % 96   FEV1% % 99          Airway Clearance: The Most Important Way to Keep Your Lungs Healthy  Vest Settings:    Hill-Rom Frequencies: 8, 9, 10 Pressure 10 Then, Frequencies 18, 19, 20 Pressure 6      RespirTech: Quick Start with Pressure of     Do each frequency for 5 minutes; Deflate vest after each frequency & cough 3 times before beginning the next setting.    Vest and Neb Therapy should be done 2 times/day.    Good Nutrition Can Improve Lung Function and Overall Health     Take ALL of your vitamins with food     Take 1/2 of your enzymes before EVERY meal/snack and the other 1/2 mid-meal/snack    Wt Readings from Last 3 Encounters:   06/28/17 57.6 kg (126 lb 14.4 oz)   06/19/17 57.4 kg (126 lb 9.6 oz)   05/24/17 57.7 kg (127 lb 3.3 oz)       Body mass index is 21.12 kg/(m^2).         National CF Foundation Recommendations for BMI in CF Adults: Women: at least 22 Men: at least 23        Controlling Blood Sugars Helps Prevent Lung Infections & Improves Nutrition  Test blood sugar:     In the morning before eating (goal is )     2 hours after a meal (goal is less than 150)     When pre-meal glucose is greater than 150 add correction     At bedtime (if less than 100 eat a snack with 15 grams of carbohydrates  Last A1C Results:   Hemoglobin A1C   Date Value Ref Range Status   06/19/2017 5.5 4.3 - 6.0 % Final         If  diabetic, measure A1C every 6 months. Goal: Under 7%    Staying Healthy    Research:  If you are interested in learning about research opportunities or have questions, please contact Rosana Salcedo at [phone] or [email or web address].       Foundation:  Compass is a personalized resource service to help you with the insurance, financial, legal and other issues you are facing.  It's free, confidential and available to anyone with CF.  Ask your  for more information or contact Compass directly at [facility] ([phone]) or compass@cff.org, or learn more at cff.org/compass.          RECOMMENDATIONS: Great job.  You will need to have your thyroid checked every year.  If you do try to become pregnant will need it checked before that time.  Be careful with your trip.   Will find a name of a MD at [employer].    YOUR GOAL:  Take care!  Enjoy your new adventure.      CF Nurse Line:  Dwayne Long: [phone]   Maci Riley, RT: [phone]     Norma Mcmullen: [phone] or Marce Guevara, Dieticians: [phone]   Denise Meza, Diabetes Nurse: [phone]      Jana Lara: [phone] or Ophelia Mccann [phone], Social Workers   [email or web address]

## 2017-06-28 NOTE — NURSING NOTE
Chief Complaint   Patient presents with     Cystic Fibrosis     Return Visit- Annual Studies today    Rajni Gómez at 8:43 AM on 6/28/2017

## 2017-06-28 NOTE — LETTER
6/28/2017       RE: Meredith Swartz  [address]  [facility] [address]     Dear Colleague,    Thank you for referring your patient, Meredith Swartz, to the [facility] at [facility]. Please see a copy of my visit note below.    Nutrition Note    Reason for Visit:  Follow-up on annual studies    Pt completed her annual studies 6/19/17.  Vitamin D level remains low at 22.  Pt continues taking MVW Complete but has not yet ordered the higher vitamin D version.     Interventions/Recommendations:  Encouraged pt to switch 1:1 to MVW Complete , 1 capsule BID, to replenish her vitamin D deficiency.  Pt also moving to [place] permanently after this visit, reminded her to update her address and pharmacy within her [employer] account.        Marce Guevara MS, RD, LD (pager [phone])  Cystic Fibrosis/Lung Transplant Dietitian      -Available Tues-Fri 8 AM-4:30 PM. On Mondays please contact pager [phone] (Norma Mcmullen RD) and on weekends/holidays contact coverage dietitian at pager [phone] (inpatient use only).     [facility]  June 28, 2017         Assessment and Plan:   Meredith Swartz is a 27 year old female with cystic fibrosis.    1. CF lung disease with normal spirometry:  Meredith has excellent stability in her pulmonary function as well as her pulmonary symptomatology. She continues to feel that the addition of inhalational cayston has been helpful.  She has put effort in to be adherent with her bronchial drainage and inhalational medication.  This is evident by her improvement in PFT and imaging that was obtained last week.  At this time, I would recommend that she continue on her present bronchial drainage and nebulized therapy.  She does continue to show evidence of Staph in her sputum.    2.  Health care maintenance:  Meredith  had her annual studies performed last week.  Her  chest xray does appear improved in the left mid-lung field per my review.  I did review the imaging with Meredith as well.  Her bone mineral density is normal.  She does have an up-trending TSH.  I discuss the rend with Dr. Khoury from endocrinology.  He suspects that she will develop hypothyroidism.  She should continue to have her TSH check yearly or with change in symptoms.  If she should pursue pregnancy, she should follow the TSH closely.  She may need replacement at that time.  I discuss this in detail with Meredith.  There were no other concerning labs.  There was no evidence of diabetes.    3.  Psychosocial.  Meredith will be moving to [place] with her significant other on the day of this visit.  He will be attending graduate school and she is trying to get a job in the [employer].  She will be seeking employment in [place].  She is very excited about this move.  She plans to continue her care at [place]. I will try to find the name of a provider for her.  4. Pancreatic Insufficiency:  The patient has no new symptoms consistent with worsening malabsorption.  The plan is to continue the present dose of pancreatic enzymes and vitamin supplementation.  5. Hypertension:  Meredith has intermittently elevated BP in clinic.  She was previously evaluated by nephrology and had an ambulatory monitor.  They did not feel that her BP warranted treatment at this time.  We have been following them closely.    Janeth Craig MD MPH   of Medicine  Pulmonary, Allergy, Critical Care and Sleep Medicine      Interval History:     Meredith feels well.  She is very excited about her move.  She denies any changes to cough frequency of sputum volume.  She continues to perform 2 vest therapies per day.         Review of Systems:     All questionnaires were reviewed by me today with the patient.  Review of Systems     Constitutional:  Negative for fever, chills, weight loss, weight gain, fatigue, decreased appetite, night  sweats, recent stressors, height gain, height loss, post-operative complications, incisional pain, hallucinations, increased energy, hyperactivity and confused.   HENT:  Negative for ear pain, hearing loss, tinnitus, nosebleeds, trouble swallowing, hoarse voice, mouth sores, sore throat, ear discharge, tooth pain, gum tenderness, taste disturbance, smell disturbance, hearing aid, bleeding gums, dry mouth, sinus pain, sinus congestion and neck mass.    Eyes:  Negative for double vision, pain, redness, eye pain, decreased vision, eye watering, eye bulging, eye dryness, flashing lights, spots, floaters, strabismus, tunnel vision, jaundice and eye irritation.   Respiratory:   Positive for cough, sputum production and cough disturbing sleep. Negative for hemoptysis, shortness of breath, wheezing, sleep disturbances due to breathing, snores loudly, respiratory pain, dyspnea on exertion and postural dyspnea.    Cardiovascular:  Negative for chest pain, dyspnea on exertion, palpitations, orthopnea, claudication, leg swelling, fingers/toes turn blue, hypertension, hypotension, syncope, history of heart murmur, chest pain on exertion, chest pain at rest, pacemaker, few scattered varicosities, leg pain, sleep disturbances due to breathing, tachycardia, light-headedness, exercise intolerance and edema.   Gastrointestinal:  Negative for heartburn, nausea, vomiting, abdominal pain, diarrhea, constipation, blood in stool, melena, rectal pain, bloating, hemorrhoids, bowel incontinence, jaundice, rectal bleeding, coffee ground emesis and change in stool.   Genitourinary:  Negative for bladder incontinence, dysuria, urgency, hematuria, flank pain, vaginal discharge, difficulty urinating, genital sores, dyspareunia, decreased libido, nocturia, voiding less frequently, arousal difficulty, abnormal vaginal bleeding, excessive menstruation, menstrual changes, hot flashes, vaginal dryness and postmenopausal bleeding.   Musculoskeletal:   Negative for myalgias, back pain, joint swelling, arthralgias, stiffness, muscle cramps, neck pain, bone pain, muscle weakness and fracture.   Skin:  Negative for nail changes, itching, poor wound healing, rash, hair changes, skin changes, acne, warts, poor wound healing, scarring, flaky skin, Raynaud's phenomenon, sensitivity to sunlight and skin thickening.   Neurological:  Negative for dizziness, tingling, tremors, speech change, seizures, loss of consciousness, weakness, light-headedness, numbness, headaches, disturbances in coordination, extremity numbness, memory loss, difficulty walking and paralysis.   Endo/Heme:  Negative for anemia, swollen glands and bruises/bleeds easily.   Psychiatric/Behavioral:  Negative for depression, hallucinations, memory loss, decreased concentration, mood swings and panic attacks.    Breast:  Negative for breast discharge, breast mass, breast pain and nipple retraction.   Endocrine:  Negative for altered temperature regulation, polyphagia, polydipsia, unwanted hair growth and change in facial hair.         Past Medical and Surgical History:     Past Medical History:   Diagnosis Date     Bronchiectasis (H)      CF (cystic fibrosis) (H)      MRSA (methicillin resistant staph aureus) culture positive      Pancreatic insufficiency      Rectal prolapse      No past surgical history on file.        Family History:     Family History   Problem Relation Age of Onset     Asthma Father      Family History Negative Mother      Asthma Sister      exercise induced      C.A.D. Paternal Grandfather      CANCER Paternal Grandmother      DIABETES Maternal Grandfather      C.A.D. Maternal Grandfather      HEART DISEASE Maternal Grandmother             Social History:     Social History     Social History     Marital status: Single     Spouse name: N/A     Number of children: N/A     Years of education: N/A     Occupational History     student [employer]     Social History Main Topics      "Smoking status: Never Smoker     Smokeless tobacco: Never Used     Alcohol use No     Drug use: No     Sexual activity: Not on file     Other Topics Concern     Not on file     Social History Narrative            Medications:     Current Outpatient Prescriptions   Medication     Albuterol Sulfate 108 (90 BASE) MCG/ACT AEPB     ZENPEP 91647 UNITS CPEP per EC capsule     albuterol (2.5 MG/3ML) 0.083% neb solution     dornase alpha (PULMOZYME) 1 MG/ML neb solution     tobramycin (ASUNCION PODHALER) 28 MG in caps     azithromycin (ZITHROMAX) 500 MG tablet     sodium chloride inhalant (HYPER-SAL) 7 % NEBU neb solution     aztreonam lysine (CAYSTON) 75 MG SOLR     Multiple Vitamins-Minerals (COMPLETE FORMULATION ) CAPS     fluticasone (FLONASE) 50 MCG/ACT nasal spray     sodium chloride, Inhalant, (BRONCHO SALINE) 0.9 % AERS     No current facility-administered medications for this visit.             Physical Exam:   /89  Pulse 101  Ht 1.651 m (5' 5\")  Wt 57.6 kg (126 lb 14.4 oz)  SpO2 94%  BMI 21.12 kg/m2    Constitutional:   Awake, alert and in no apparent distress     Eyes:   nonicteric     ENT:   oral mucosa moist without lesions, normal tm bilaterally, bilateral mucosa normal     Neck:   Supple without supraclavicular or cervical lymphadenopathy     Lungs:   Good air flow.  No crackles. No rhonchi.  No wheezes.     Cardiovascular:   Normal S1 and S2.  Tachycardia RR.  No murmur, gallop or rub.     Abdomen:   NABS, soft, nontender, nondistended.       Musculoskeletal:   No edema, digital clubbing present     Neurologic:   Alert and conversant.     Skin:   Warm, dry.  No rash on limited exam.             Data:   All laboratory and imaging data reviewed.    Cystic Fibrosis Culture  Specimen Description   Date Value Ref Range Status   05/24/2017 Sputum  Final   02/22/2017 Throat  Final   11/22/2016 Sputum  Final   11/22/2016 Sputum  Final   11/22/2016 Sputum  Final    Culture Micro   Date Value Ref Range " Status   05/24/2017 Moderate growth Normal karen  Final   02/22/2017 (A)  Final    Moderate growth Normal karen  Light growth Staphylococcus pseudintermedius Susceptibility testing not routinely   done     11/22/2016 (A)  Final    Heavy growth Normal karen  Single colony Aspergillus fumigatus     11/22/2016   Final    Culture negative for acid fast bacilli  Assayed at [facility] [address]          No results found for this or any previous visit (from the past 168 hour(s)).    PFT: The spirometry is normal.  When compared to 5/24/2017, the FEV1 and FVC have little change.      Again, thank you for allowing me to participate in the care of your patient.      Sincerely,    Janeth Craig MD

## 2017-06-28 NOTE — PROGRESS NOTES
Nutrition Note    Reason for Visit:  Follow-up on annual studies    Pt completed her annual studies 6/19/17.  Vitamin D level remains low at 22.  Pt continues taking MVW Complete but has not yet ordered the higher vitamin D version.     Interventions/Recommendations:  Encouraged pt to switch 1:1 to MVW Complete , 1 capsule BID, to replenish her vitamin D deficiency.  Pt also moving to [place] permanently after this visit, reminded her to update her address and pharmacy within her [employer] account.        Marce Guevara MS, RD, LD (pager [phone])  Cystic Fibrosis/Lung Transplant Dietitian      -Available Tues-Fri 8 AM-4:30 PM. On Mondays please contact pager [phone] (Norma Mcmullen RD) and on weekends/holidays contact coverage dietitian at pager [phone] (inpatient use only).

## 2017-06-28 NOTE — MR AVS SNAPSHOT
MRN:[id number]                      After Visit Summary   6/28/2017    Meredith Swartz    MRN: [id number]           Visit Information        Provider Department      6/28/2017 8:30 AM Janeth Craig MD [facility]        Care Instructions    Cystic Fibrosis Self-Care Plan       MRN: [id number]   Clinic Date: June 28, 2017   Patient: Meredith Swartz     Annual Studies:   IGG   Date Value Ref Range Status   06/19/2017 1650 (H) 695 - 1620 mg/dL Final     Insulin   Date Value Ref Range Status   06/19/2017 53.1 (H) 3 - 25 mU/L Final     There are no preventive care reminders to display for this patient.      Pulmonary Function Tests  FEV1: amount of air you can blow out in 1 second  FVC: total amount of air you can take in and blow out    Your Goals:         PFT Latest Ref Rng & Units 6/19/2017   FVC L 3.41   FEV1 L 3.05   FVC% % 96   FEV1% % 99          Airway Clearance: The Most Important Way to Keep Your Lungs Healthy  Vest Settings:    Hill-Rom Frequencies: 8, 9, 10 Pressure 10 Then, Frequencies 18, 19, 20 Pressure 6      [employer]pirTech: Quick Start with Pressure of     Do each frequency for 5 minutes; Deflate vest after each frequency & cough 3 times before beginning the next setting.    Vest and Neb Therapy should be done 2 times/day.    Good Nutrition Can Improve Lung Function and Overall Health     Take ALL of your vitamins with food     Take 1/2 of your enzymes before EVERY meal/snack and the other 1/2 mid-meal/snack    Wt Readings from Last 3 Encounters:   06/28/17 57.6 kg (126 lb 14.4 oz)   06/19/17 57.4 kg (126 lb 9.6 oz)   05/24/17 57.7 kg (127 lb 3.3 oz)       Body mass index is 21.12 kg/(m^2).         National CF Foundation Recommendations for BMI in CF Adults: Women: at least 22 Men: at least 23        Controlling Blood Sugars Helps Prevent Lung Infections & Improves Nutrition  Test blood sugar:     In the morning before eating (goal is )      2 hours after a meal (goal is less than 150)     When pre-meal glucose is greater than 150 add correction     At bedtime (if less than 100 eat a snack with 15 grams of carbohydrates  Last A1C Results:   Hemoglobin A1C   Date Value Ref Range Status   06/19/2017 5.5 4.3 - 6.0 % Final         If diabetic, measure A1C every 6 months. Goal: Under 7%    Staying Healthy    Research:  If you are interested in learning about research opportunities or have questions, please contact Rosana Salcedo at [phone] or [email or web address].       Foundation:  Compass is a personalized resource service to help you with the insurance, financial, legal and other issues you are facing.  It's free, confidential and available to anyone with CF.  Ask your  for more information or contact Compass directly at [facility] ([phone]) or compass@cff.org, or learn more at cff.org/compass.          RECOMMENDATIONS: Great job.  You will need to have your thyroid checked every year.  If you do try to become pregnant will need it checked before that time.  Be careful with your trip.   Will find a name of a MD at [place].    YOUR GOAL:  Take care!  Enjoy your new adventure.      CF Nurse Line:  Dwayne Long: [phone]   Maci Riley, RT: [phone]     Norma Mcmullen: [phone] or Coretta Sotoicians: [phone]   Denise Meza, Diabetes Nurse: [phone]      Jana Lara: [phone] or Ophelia Mccann [phone], Social Workers   [email or web address]                  MyChart Information     [employer] gives you secure access to your electronic health record. If you see a primary care provider, you can also send messages to your care team and make appointments. If you have questions, please call your primary care clinic.  If you do not have a primary care provider, please call [phone] and they will assist you.        Care EveryWhere ID     This is your Care EveryWhere ID. This could be used  by other organizations to access your [facility] medical records  [id number]        Equal Access to Services     SHIRLENE HERNANDEZ : Adonay Ceballos, romario johnson, marcos quiñones. [facility] [phone].    ATENCIÓN: Si habla español, tiene a prado disposición servicios gratuitos de asistencia lingüística. Llame al [phone].    We comply with applicable federal civil rights laws and [place] laws. We do not discriminate on the basis of race, color, national origin, age, disability sex, sexual orientation or gender identity.

## 2017-06-28 NOTE — PROGRESS NOTES
[facility]  June 28, 2017         Assessment and Plan:   Meredith Swartz is a 27 year old female with cystic fibrosis.    1. CF lung disease with normal spirometry:  Meredith has excellent stability in her pulmonary function as well as her pulmonary symptomatology. She continues to feel that the addition of inhalational cayston has been helpful.  She has put effort in to be adherent with her bronchial drainage and inhalational medication.  This is evident by her improvement in PFT and imaging that was obtained last week.  At this time, I would recommend that she continue on her present bronchial drainage and nebulized therapy.  She does continue to show evidence of Staph in her sputum.    2.  Health care maintenance:  Meredith had her annual studies performed last week.  Her chest xray does appear improved in the left mid-lung field per my review.  I did review the imaging with Meredith as well.  Her bone mineral density is normal.  She does have an up-trending TSH.  I discuss the rend with Dr. Khoury from endocrinology.  He suspects that she will develop hypothyroidism.  She should continue to have her TSH check yearly or with change in symptoms.  If she should pursue pregnancy, she should follow the TSH closely.  She may need replacement at that time.  I discuss this in detail with Meredtih.  There were no other concerning labs.  There was no evidence of diabetes.    3.  Psychosocial.  Meredith will be moving to [place] with her significant other on the day of this visit.  He will be attending graduate school and she is trying to get a job in the [employer].  She will be seeking employment in [place].  She is very excited about this move.  She plans to continue her care at [facility]. I will try to find the name of a provider for her.  4. Pancreatic Insufficiency:  The patient has no new symptoms consistent with worsening malabsorption.  The plan is to continue the present  dose of pancreatic enzymes and vitamin supplementation.  5. Hypertension:  Meredith has intermittently elevated BP in clinic.  She was previously evaluated by nephrology and had an ambulatory monitor.  They did not feel that her BP warranted treatment at this time.  We have been following them closely.    Janteh Craig MD MPH   of Medicine  Pulmonary, Allergy, Critical Care and Sleep Medicine      Interval History:     Meredith feels well.  She is very excited about her move.  She denies any changes to cough frequency of sputum volume.  She continues to perform 2 vest therapies per day.         Review of Systems:     All questionnaires were reviewed by me today with the patient.  Review of Systems     Constitutional:  Negative for fever, chills, weight loss, weight gain, fatigue, decreased appetite, night sweats, recent stressors, height gain, height loss, post-operative complications, incisional pain, hallucinations, increased energy, hyperactivity and confused.   HENT:  Negative for ear pain, hearing loss, tinnitus, nosebleeds, trouble swallowing, hoarse voice, mouth sores, sore throat, ear discharge, tooth pain, gum tenderness, taste disturbance, smell disturbance, hearing aid, bleeding gums, dry mouth, sinus pain, sinus congestion and neck mass.    Eyes:  Negative for double vision, pain, redness, eye pain, decreased vision, eye watering, eye bulging, eye dryness, flashing lights, spots, floaters, strabismus, tunnel vision, jaundice and eye irritation.   Respiratory:   Positive for cough, sputum production and cough disturbing sleep. Negative for hemoptysis, shortness of breath, wheezing, sleep disturbances due to breathing, snores loudly, respiratory pain, dyspnea on exertion and postural dyspnea.    Cardiovascular:  Negative for chest pain, dyspnea on exertion, palpitations, orthopnea, claudication, leg swelling, fingers/toes turn blue, hypertension, hypotension, syncope, history of heart  murmur, chest pain on exertion, chest pain at rest, pacemaker, few scattered varicosities, leg pain, sleep disturbances due to breathing, tachycardia, light-headedness, exercise intolerance and edema.   Gastrointestinal:  Negative for heartburn, nausea, vomiting, abdominal pain, diarrhea, constipation, blood in stool, melena, rectal pain, bloating, hemorrhoids, bowel incontinence, jaundice, rectal bleeding, coffee ground emesis and change in stool.   Genitourinary:  Negative for bladder incontinence, dysuria, urgency, hematuria, flank pain, vaginal discharge, difficulty urinating, genital sores, dyspareunia, decreased libido, nocturia, voiding less frequently, arousal difficulty, abnormal vaginal bleeding, excessive menstruation, menstrual changes, hot flashes, vaginal dryness and postmenopausal bleeding.   Musculoskeletal:  Negative for myalgias, back pain, joint swelling, arthralgias, stiffness, muscle cramps, neck pain, bone pain, muscle weakness and fracture.   Skin:  Negative for nail changes, itching, poor wound healing, rash, hair changes, skin changes, acne, warts, poor wound healing, scarring, flaky skin, Raynaud's phenomenon, sensitivity to sunlight and skin thickening.   Neurological:  Negative for dizziness, tingling, tremors, speech change, seizures, loss of consciousness, weakness, light-headedness, numbness, headaches, disturbances in coordination, extremity numbness, memory loss, difficulty walking and paralysis.   Endo/Heme:  Negative for anemia, swollen glands and bruises/bleeds easily.   Psychiatric/Behavioral:  Negative for depression, hallucinations, memory loss, decreased concentration, mood swings and panic attacks.    Breast:  Negative for breast discharge, breast mass, breast pain and nipple retraction.   Endocrine:  Negative for altered temperature regulation, polyphagia, polydipsia, unwanted hair growth and change in facial hair.         Past Medical and Surgical History:     Past Medical  "History:   Diagnosis Date     Bronchiectasis (H)      CF (cystic fibrosis) (H)      MRSA (methicillin resistant staph aureus) culture positive      Pancreatic insufficiency      Rectal prolapse      No past surgical history on file.        Family History:     Family History   Problem Relation Age of Onset     Asthma Father      Family History Negative Mother      Asthma Sister      exercise induced      C.A.D. Paternal Grandfather      CANCER Paternal Grandmother      DIABETES Maternal Grandfather      C.A.D. Maternal Grandfather      HEART DISEASE Maternal Grandmother             Social History:     Social History     Social History     Marital status: Single     Spouse name: N/A     Number of children: N/A     Years of education: N/A     Occupational History     student [employer]     Social History Main Topics     Smoking status: Never Smoker     Smokeless tobacco: Never Used     Alcohol use No     Drug use: No     Sexual activity: Not on file     Other Topics Concern     Not on file     Social History Narrative            Medications:     Current Outpatient Prescriptions   Medication     Albuterol Sulfate 108 (90 BASE) MCG/ACT AEPB     ZENPEP 72701 UNITS CPEP per EC capsule     albuterol (2.5 MG/3ML) 0.083% neb solution     dornase alpha (PULMOZYME) 1 MG/ML neb solution     tobramycin (ASUNCION PODHALER) 28 MG in caps     azithromycin (ZITHROMAX) 500 MG tablet     sodium chloride inhalant (HYPER-SAL) 7 % NEBU neb solution     aztreonam lysine (CAYSTON) 75 MG SOLR     Multiple Vitamins-Minerals (COMPLETE FORMULATION ) CAPS     fluticasone (FLONASE) 50 MCG/ACT nasal spray     sodium chloride, Inhalant, (BRONCHO SALINE) 0.9 % AERS     No current facility-administered medications for this visit.             Physical Exam:   /89  Pulse 101  Ht 1.651 m (5' 5\")  Wt 57.6 kg (126 lb 14.4 oz)  SpO2 94%  BMI 21.12 kg/m2    Constitutional:   Awake, alert and in no apparent distress     Eyes:   nonicteric "     ENT:   oral mucosa moist without lesions, normal tm bilaterally, bilateral mucosa normal     Neck:   Supple without supraclavicular or cervical lymphadenopathy     Lungs:   Good air flow.  No crackles. No rhonchi.  No wheezes.     Cardiovascular:   Normal S1 and S2.  Tachycardia RR.  No murmur, gallop or rub.     Abdomen:   NABS, soft, nontender, nondistended.       Musculoskeletal:   No edema, digital clubbing present     Neurologic:   Alert and conversant.     Skin:   Warm, dry.  No rash on limited exam.             Data:   All laboratory and imaging data reviewed.    Cystic Fibrosis Culture  Specimen Description   Date Value Ref Range Status   05/24/2017 Sputum  Final   02/22/2017 Throat  Final   11/22/2016 Sputum  Final   11/22/2016 Sputum  Final   11/22/2016 Sputum  Final    Culture Micro   Date Value Ref Range Status   05/24/2017 Moderate growth Normal karen  Final   02/22/2017 (A)  Final    Moderate growth Normal karen  Light growth Staphylococcus pseudintermedius Susceptibility testing not routinely   done     11/22/2016 (A)  Final    Heavy growth Normal karen  Single colony Aspergillus fumigatus     11/22/2016   Final    Culture negative for acid fast bacilli  Assayed at [facility] [address]          No results found for this or any previous visit (from the past 168 hour(s)).    PFT: The spirometry is normal.  When compared to 5/24/2017, the FEV1 and FVC have little change.

## 2017-06-29 ASSESSMENT — ENCOUNTER SYMPTOMS
TREMORS: 0
JOINT SWELLING: 0
PANIC: 0
SYNCOPE: 0
INSOMNIA: 0
NIGHT SWEATS: 0
DYSPNEA ON EXERTION: 0
DECREASED CONCENTRATION: 0
ARTHRALGIAS: 0
PARALYSIS: 0
WEAKNESS: 0
SINUS PAIN: 0
POLYPHAGIA: 0
NECK MASS: 0
LEG SWELLING: 0
SPEECH CHANGE: 0
POOR WOUND HEALING: 0
STIFFNESS: 0
EYE IRRITATION: 0
HEARTBURN: 0
LEG PAIN: 0
TROUBLE SWALLOWING: 0
MUSCLE WEAKNESS: 0
DEPRESSION: 0
NECK PAIN: 0
DIFFICULTY URINATING: 0
BLOOD IN STOOL: 0
HEMOPTYSIS: 0
LOSS OF CONSCIOUSNESS: 0
CLAUDICATION: 0
NERVOUS/ANXIOUS: 0
HYPOTENSION: 0
NAIL CHANGES: 0
POSTURAL DYSPNEA: 0
VOMITING: 0
SMELL DISTURBANCE: 0
EXTREMITY NUMBNESS: 0
SPUTUM PRODUCTION: 1
POLYDIPSIA: 0
RECTAL BLEEDING: 0
ALTERED TEMPERATURE REGULATION: 0
DIARRHEA: 0
HEADACHES: 0
WHEEZING: 0
JAUNDICE: 0
FLANK PAIN: 0
EXERCISE INTOLERANCE: 0
SINUS CONGESTION: 0
DOUBLE VISION: 0
TINGLING: 0
BREAST PAIN: 0
NAUSEA: 0
SEIZURES: 0
MUSCLE CRAMPS: 0
SNORES LOUDLY: 0
WEIGHT LOSS: 0
NUMBNESS: 0
FATIGUE: 0
COUGH DISTURBING SLEEP: 1
HOARSE VOICE: 0
BREAST MASS: 0
WEIGHT GAIN: 0
CHILLS: 0
BLOATING: 0
DECREASED LIBIDO: 0
FEVER: 0
DYSURIA: 0
HOT FLASHES: 0
BRUISES/BLEEDS EASILY: 0
HEMATURIA: 0
ABDOMINAL PAIN: 0
EYE WATERING: 0
HALLUCINATIONS: 0
CONSTIPATION: 0
SORE THROAT: 0
BACK PAIN: 0
SWOLLEN GLANDS: 0
INCREASED ENERGY: 0
PALPITATIONS: 0
RECTAL PAIN: 0
DECREASED APPETITE: 0
HYPERTENSION: 0
ORTHOPNEA: 0
MYALGIAS: 0
COUGH: 1
EYE PAIN: 0
MEMORY LOSS: 0
EYE REDNESS: 0
RESPIRATORY PAIN: 0
DIZZINESS: 0
BOWEL INCONTINENCE: 0
TACHYCARDIA: 0
LIGHT-HEADEDNESS: 0
SKIN CHANGES: 0
SHORTNESS OF BREATH: 0
TASTE DISTURBANCE: 0
SLEEP DISTURBANCES DUE TO BREATHING: 0
DISTURBANCES IN COORDINATION: 0

## 2017-07-19 ENCOUNTER — MYC REFILL (OUTPATIENT)
Dept: PULMONOLOGY | Facility: CLINIC | Age: 27
End: 2017-07-19

## 2017-07-19 DIAGNOSIS — E84.0 CYSTIC FIBROSIS WITH PULMONARY MANIFESTATIONS (H): ICD-10-CM

## 2017-07-20 NOTE — TELEPHONE ENCOUNTER
Message from MyChart:  Original authorizing provider: MD Radha Rangelanders LLANESMonika Swartz would like a refill of the following medications:  dornase alpha (PULMOZYME) 1 MG/ML neb solution [Janeth Carig MD]    Preferred pharmacy: Other - [facility]    Comment:

## 2017-07-21 ENCOUNTER — MYC REFILL (OUTPATIENT)
Dept: PULMONOLOGY | Facility: CLINIC | Age: 27
End: 2017-07-21

## 2017-07-21 DIAGNOSIS — E84.0 CYSTIC FIBROSIS WITH PULMONARY MANIFESTATIONS (H): ICD-10-CM

## 2017-07-24 NOTE — TELEPHONE ENCOUNTER
Message from MyChart:  Original authorizing provider: Janeth Craig MD    Meredith Swartz would like a refill of the following medications:  aztreonam lysine (CAYSTON) 75 MG SOLR [Janeth Craig MD]  tobramycin (ASUNCION PODHALER) 28 MG in caps [Janeth Craig MD]  dornase alpha (PULMOZYME) 1 MG/ML neb solution [Janeth Craig MD]    Preferred pharmacy: Other - [facility] in [facility]    Comment:  Please send to: [facility] [id number], [phone] [place] [address] phone: [phone] fax: [phone]

## 2017-08-01 DIAGNOSIS — E84.0 CYSTIC FIBROSIS WITH PULMONARY MANIFESTATIONS (H): ICD-10-CM

## 2017-08-10 DIAGNOSIS — E84.0 CYSTIC FIBROSIS WITH PULMONARY MANIFESTATIONS (H): ICD-10-CM

## 2017-08-10 RX ORDER — AZITHROMYCIN 500 MG/1
500 TABLET, FILM COATED ORAL
Qty: 12 TABLET | Refills: 12 | Status: SHIPPED | OUTPATIENT
Start: 2017-08-11

## 2017-08-11 ENCOUNTER — TELEPHONE (OUTPATIENT)
Dept: PULMONOLOGY | Facility: CLINIC | Age: 27
End: 2017-08-11

## 2017-08-11 NOTE — TELEPHONE ENCOUNTER
Started PA through [employer]. Waiting for patient to call back, need demographic information to be able to complete PA through [facility]. CovermyMeds Keycode: [id number].

## 2017-08-14 NOTE — TELEPHONE ENCOUNTER
Received phone call from Meredith. She asked that we work on the PA for pulmozyme, as she has not yet established care at her new CF center. She gave the needed demographic info. Sent PA to [employer], will call her pharmacy to let them know we are working on for her.

## 2017-08-14 NOTE — TELEPHONE ENCOUNTER
Left message for Meredith to call back to see if we should be pursuing the PA for pulmozyme, or if this will be followed by her new doctor at [facility].

## 2017-08-16 NOTE — TELEPHONE ENCOUNTER
Spoke with rep at [facility] ([place] medicaid), they stated that they will not cover any prescriptions written by an out of state provider. Prescriptions must be written by a [place] doctor. They recommended that the patient go the emergency room and have an ER doc write the prescriptions for the meds she needs. Left her a message letting her know we may be able to go through the  for a bridge supply.

## 2017-11-19 ENCOUNTER — HEALTH MAINTENANCE LETTER (OUTPATIENT)
Age: 27
End: 2017-11-19

## 2018-01-03 DIAGNOSIS — E84.0 CYSTIC FIBROSIS WITH PULMONARY MANIFESTATIONS (H): ICD-10-CM

## 2018-01-03 RX ORDER — SODIUM CHLORIDE FOR INHALATION 7 %
4 VIAL, NEBULIZER (ML) INHALATION 2 TIMES DAILY
Qty: 240 ML | Refills: 12 | Status: SHIPPED | OUTPATIENT
Start: 2018-01-03

## 2020-03-02 ENCOUNTER — HEALTH MAINTENANCE LETTER (OUTPATIENT)
Age: 30
End: 2020-03-02

## 2020-12-14 ENCOUNTER — HEALTH MAINTENANCE LETTER (OUTPATIENT)
Age: 30
End: 2020-12-14

## 2021-04-18 ENCOUNTER — HEALTH MAINTENANCE LETTER (OUTPATIENT)
Age: 31
End: 2021-04-18

## 2021-10-02 ENCOUNTER — HEALTH MAINTENANCE LETTER (OUTPATIENT)
Age: 31
End: 2021-10-02

## 2022-05-14 ENCOUNTER — HEALTH MAINTENANCE LETTER (OUTPATIENT)
Age: 32
End: 2022-05-14

## 2022-09-03 ENCOUNTER — HEALTH MAINTENANCE LETTER (OUTPATIENT)
Age: 32
End: 2022-09-03

## 2023-06-03 ENCOUNTER — HEALTH MAINTENANCE LETTER (OUTPATIENT)
Age: 33
End: 2023-06-03